# Patient Record
Sex: FEMALE | Race: WHITE | Employment: FULL TIME | URBAN - METROPOLITAN AREA
[De-identification: names, ages, dates, MRNs, and addresses within clinical notes are randomized per-mention and may not be internally consistent; named-entity substitution may affect disease eponyms.]

---

## 2017-04-20 ENCOUNTER — OFFICE VISIT (OUTPATIENT)
Dept: INTERNAL MEDICINE CLINIC | Age: 57
End: 2017-04-20

## 2017-04-20 VITALS
WEIGHT: 172 LBS | BODY MASS INDEX: 34.68 KG/M2 | HEIGHT: 59 IN | DIASTOLIC BLOOD PRESSURE: 78 MMHG | HEART RATE: 72 BPM | SYSTOLIC BLOOD PRESSURE: 120 MMHG | RESPIRATION RATE: 16 BRPM

## 2017-04-20 DIAGNOSIS — Z23 NEED FOR DIPHTHERIA-TETANUS-PERTUSSIS (TDAP) VACCINE, ADULT/ADOLESCENT: ICD-10-CM

## 2017-04-20 DIAGNOSIS — B35.1 ONYCHOMYCOSIS: ICD-10-CM

## 2017-04-20 DIAGNOSIS — Z11.59 NEED FOR HEPATITIS C SCREENING TEST: ICD-10-CM

## 2017-04-20 DIAGNOSIS — Z00.00 ANNUAL PHYSICAL EXAM: Primary | ICD-10-CM

## 2017-04-20 DIAGNOSIS — Z12.11 SCREEN FOR COLON CANCER: ICD-10-CM

## 2017-04-20 DIAGNOSIS — R79.89 ELEVATED LFTS: ICD-10-CM

## 2017-04-20 LAB
A/G RATIO: 1.5 (ref 1.1–2.2)
ALBUMIN SERPL-MCNC: 4.2 G/DL (ref 3.4–5)
ALP BLD-CCNC: 116 U/L (ref 40–129)
ALT SERPL-CCNC: 46 U/L (ref 10–40)
ANION GAP SERPL CALCULATED.3IONS-SCNC: 16 MMOL/L (ref 3–16)
AST SERPL-CCNC: 51 U/L (ref 15–37)
BASOPHILS ABSOLUTE: 0.1 K/UL (ref 0–0.2)
BASOPHILS RELATIVE PERCENT: 1.1 %
BILIRUB SERPL-MCNC: 0.6 MG/DL (ref 0–1)
BUN BLDV-MCNC: 10 MG/DL (ref 7–20)
CALCIUM SERPL-MCNC: 9.1 MG/DL (ref 8.3–10.6)
CHLORIDE BLD-SCNC: 98 MMOL/L (ref 99–110)
CHOLESTEROL, TOTAL: 156 MG/DL (ref 0–199)
CO2: 26 MMOL/L (ref 21–32)
CREAT SERPL-MCNC: 0.5 MG/DL (ref 0.6–1.1)
CREATININE URINE: 262.4 MG/DL (ref 28–259)
EOSINOPHILS ABSOLUTE: 0.2 K/UL (ref 0–0.6)
EOSINOPHILS RELATIVE PERCENT: 3.3 %
GFR AFRICAN AMERICAN: >60
GFR NON-AFRICAN AMERICAN: >60
GLOBULIN: 2.8 G/DL
GLUCOSE BLD-MCNC: 198 MG/DL (ref 70–99)
HCT VFR BLD CALC: 43.4 % (ref 36–48)
HDLC SERPL-MCNC: 43 MG/DL (ref 40–60)
HEMOGLOBIN: 14 G/DL (ref 12–16)
HEPATITIS C ANTIBODY INTERPRETATION: NORMAL
LDL CHOLESTEROL CALCULATED: 90 MG/DL
LYMPHOCYTES ABSOLUTE: 2.3 K/UL (ref 1–5.1)
LYMPHOCYTES RELATIVE PERCENT: 33 %
MCH RBC QN AUTO: 27.7 PG (ref 26–34)
MCHC RBC AUTO-ENTMCNC: 32.2 G/DL (ref 31–36)
MCV RBC AUTO: 85.8 FL (ref 80–100)
MICROALBUMIN UR-MCNC: 2.9 MG/DL
MICROALBUMIN/CREAT UR-RTO: 11.1 MG/G (ref 0–30)
MONOCYTES ABSOLUTE: 0.4 K/UL (ref 0–1.3)
MONOCYTES RELATIVE PERCENT: 6.5 %
NEUTROPHILS ABSOLUTE: 3.8 K/UL (ref 1.7–7.7)
NEUTROPHILS RELATIVE PERCENT: 56.1 %
PDW BLD-RTO: 13.9 % (ref 12.4–15.4)
PLATELET # BLD: 313 K/UL (ref 135–450)
PMV BLD AUTO: 8.6 FL (ref 5–10.5)
POTASSIUM SERPL-SCNC: 4.5 MMOL/L (ref 3.5–5.1)
RBC # BLD: 5.06 M/UL (ref 4–5.2)
SODIUM BLD-SCNC: 140 MMOL/L (ref 136–145)
T4 FREE: 1.3 NG/DL (ref 0.9–1.8)
TOTAL PROTEIN: 7 G/DL (ref 6.4–8.2)
TRIGL SERPL-MCNC: 115 MG/DL (ref 0–150)
VLDLC SERPL CALC-MCNC: 23 MG/DL
WBC # BLD: 6.8 K/UL (ref 4–11)

## 2017-04-20 PROCEDURE — 99386 PREV VISIT NEW AGE 40-64: CPT | Performed by: INTERNAL MEDICINE

## 2017-04-20 PROCEDURE — 90471 IMMUNIZATION ADMIN: CPT | Performed by: INTERNAL MEDICINE

## 2017-04-20 PROCEDURE — 90715 TDAP VACCINE 7 YRS/> IM: CPT | Performed by: INTERNAL MEDICINE

## 2017-04-20 PROCEDURE — 99203 OFFICE O/P NEW LOW 30 MIN: CPT | Performed by: INTERNAL MEDICINE

## 2017-04-20 PROCEDURE — 36415 COLL VENOUS BLD VENIPUNCTURE: CPT | Performed by: INTERNAL MEDICINE

## 2017-04-21 PROBLEM — R79.89 ELEVATED LFTS: Status: ACTIVE | Noted: 2017-04-21

## 2017-04-21 LAB
ESTIMATED AVERAGE GLUCOSE: 234.6 MG/DL
HBA1C MFR BLD: 9.8 %

## 2017-04-21 RX ORDER — LOVASTATIN 10 MG/1
10 TABLET ORAL NIGHTLY
Qty: 90 TABLET | Refills: 3 | Status: SHIPPED | OUTPATIENT
Start: 2017-04-21

## 2017-04-21 RX ORDER — LISINOPRIL 5 MG/1
5 TABLET ORAL DAILY
Qty: 90 TABLET | Refills: 3 | Status: SHIPPED | OUTPATIENT
Start: 2017-04-21

## 2017-04-21 RX ORDER — BLOOD-GLUCOSE METER
1 KIT MISCELLANEOUS DAILY PRN
Qty: 1 KIT | Refills: 0 | Status: SHIPPED | OUTPATIENT
Start: 2017-04-21

## 2017-04-21 RX ORDER — LANCETS 28 GAUGE
1 EACH MISCELLANEOUS DAILY
Qty: 100 EACH | Refills: 3 | Status: SHIPPED | OUTPATIENT
Start: 2017-04-21

## 2017-06-09 ENCOUNTER — OFFICE VISIT (OUTPATIENT)
Dept: INTERNAL MEDICINE CLINIC | Age: 57
End: 2017-06-09

## 2017-06-09 VITALS
WEIGHT: 172 LBS | HEIGHT: 59 IN | RESPIRATION RATE: 16 BRPM | BODY MASS INDEX: 34.68 KG/M2 | HEART RATE: 78 BPM | DIASTOLIC BLOOD PRESSURE: 72 MMHG | SYSTOLIC BLOOD PRESSURE: 124 MMHG

## 2017-06-09 DIAGNOSIS — Z23 NEED FOR STREPTOCOCCUS PNEUMONIAE VACCINATION: ICD-10-CM

## 2017-06-09 DIAGNOSIS — R79.89 ELEVATED LFTS: ICD-10-CM

## 2017-06-09 PROCEDURE — 90732 PPSV23 VACC 2 YRS+ SUBQ/IM: CPT | Performed by: INTERNAL MEDICINE

## 2017-06-09 PROCEDURE — 90471 IMMUNIZATION ADMIN: CPT | Performed by: INTERNAL MEDICINE

## 2017-06-09 PROCEDURE — 99214 OFFICE O/P EST MOD 30 MIN: CPT | Performed by: INTERNAL MEDICINE

## 2017-06-19 ENCOUNTER — OFFICE VISIT (OUTPATIENT)
Dept: INTERNAL MEDICINE CLINIC | Age: 57
End: 2017-06-19

## 2017-06-19 VITALS
RESPIRATION RATE: 16 BRPM | WEIGHT: 171 LBS | HEIGHT: 59 IN | HEART RATE: 64 BPM | BODY MASS INDEX: 34.47 KG/M2 | DIASTOLIC BLOOD PRESSURE: 82 MMHG | SYSTOLIC BLOOD PRESSURE: 115 MMHG

## 2017-06-19 DIAGNOSIS — R42 DIZZINESS: Primary | ICD-10-CM

## 2017-06-19 LAB
BILIRUBIN, POC: NEGATIVE
BLOOD URINE, POC: NEGATIVE
CLARITY, POC: CLEAR
COLOR, POC: YELLOW
GLUCOSE URINE, POC: NEGATIVE
KETONES, POC: NEGATIVE
LEUKOCYTE EST, POC: NEGATIVE
NITRITE, POC: NEGATIVE
PH, POC: 6
PROTEIN, POC: NEGATIVE
SPECIFIC GRAVITY, POC: 1.01
UROBILINOGEN, POC: NEGATIVE

## 2017-06-19 PROCEDURE — 81002 URINALYSIS NONAUTO W/O SCOPE: CPT | Performed by: INTERNAL MEDICINE

## 2017-06-19 PROCEDURE — 99213 OFFICE O/P EST LOW 20 MIN: CPT | Performed by: INTERNAL MEDICINE

## 2017-07-24 ENCOUNTER — HOSPITAL ENCOUNTER (OUTPATIENT)
Dept: GENERAL RADIOLOGY | Age: 57
Discharge: HOME | End: 2017-07-24
Attending: INTERNAL MEDICINE | Admitting: INTERNAL MEDICINE

## 2017-07-24 ENCOUNTER — OFFICE VISIT (OUTPATIENT)
Dept: INTERNAL MEDICINE CLINIC | Age: 57
End: 2017-07-24

## 2017-07-24 VITALS
RESPIRATION RATE: 16 BRPM | HEART RATE: 78 BPM | HEIGHT: 59 IN | BODY MASS INDEX: 33.87 KG/M2 | WEIGHT: 168 LBS | DIASTOLIC BLOOD PRESSURE: 84 MMHG | SYSTOLIC BLOOD PRESSURE: 130 MMHG

## 2017-07-24 PROCEDURE — 99213 OFFICE O/P EST LOW 20 MIN: CPT | Performed by: INTERNAL MEDICINE

## 2017-07-25 LAB
ESTIMATED AVERAGE GLUCOSE: 168.6 MG/DL
HBA1C MFR BLD: 7.5 %

## 2017-10-11 ENCOUNTER — OFFICE VISIT (OUTPATIENT)
Dept: INTERNAL MEDICINE CLINIC | Age: 57
End: 2017-10-11

## 2017-10-11 VITALS
HEIGHT: 59 IN | RESPIRATION RATE: 18 BRPM | BODY MASS INDEX: 33.67 KG/M2 | HEART RATE: 78 BPM | DIASTOLIC BLOOD PRESSURE: 84 MMHG | SYSTOLIC BLOOD PRESSURE: 115 MMHG | WEIGHT: 167 LBS

## 2017-10-11 DIAGNOSIS — E78.5 HYPERLIPIDEMIA ASSOCIATED WITH TYPE 2 DIABETES MELLITUS (HCC): ICD-10-CM

## 2017-10-11 DIAGNOSIS — E11.69 HYPERLIPIDEMIA ASSOCIATED WITH TYPE 2 DIABETES MELLITUS (HCC): ICD-10-CM

## 2017-10-11 DIAGNOSIS — R79.89 ELEVATED LFTS: ICD-10-CM

## 2017-10-11 DIAGNOSIS — E11.9 CONTROLLED TYPE 2 DIABETES MELLITUS WITHOUT COMPLICATION, WITHOUT LONG-TERM CURRENT USE OF INSULIN (HCC): Primary | ICD-10-CM

## 2017-10-11 DIAGNOSIS — H01.001 BLEPHARITIS OF RIGHT UPPER EYELID, UNSPECIFIED TYPE: ICD-10-CM

## 2017-10-11 LAB
ALBUMIN SERPL-MCNC: 4.1 G/DL (ref 3.4–5)
ALP BLD-CCNC: 83 U/L (ref 40–129)
ALT SERPL-CCNC: 44 U/L (ref 10–40)
AST SERPL-CCNC: 54 U/L (ref 15–37)
BILIRUB SERPL-MCNC: 0.4 MG/DL (ref 0–1)
BILIRUBIN DIRECT: <0.2 MG/DL (ref 0–0.3)
BILIRUBIN, INDIRECT: ABNORMAL MG/DL (ref 0–1)
TOTAL PROTEIN: 6.9 G/DL (ref 6.4–8.2)

## 2017-10-11 PROCEDURE — 36415 COLL VENOUS BLD VENIPUNCTURE: CPT | Performed by: INTERNAL MEDICINE

## 2017-10-11 PROCEDURE — 99214 OFFICE O/P EST MOD 30 MIN: CPT | Performed by: INTERNAL MEDICINE

## 2017-10-11 NOTE — PROGRESS NOTES
Fabiana Reese  YOB: 1960    Date of Service:  10/11/2017    Chief Complaint:      Chief Complaint   Patient presents with    3 Month Follow-Up      DM       HPI:  Fabiana Reese is a 62 y.o. She woke up with right upper lid swollen and says this happens about 3-4 times a year and will go away the same day. She's seen opthalmologist for it and no antibiotic is usually given. Treatment Adherence:   Medication compliance:  compliant most of the time  Diet compliance:  compliant most of the time  Weight trend: stable  Current exercise: walks 3 time(s) per week  Barriers: none      Diabetes Mellitus Type 2: Current symptoms/problems include none.  She was tolerating Janumet 50/500 mg bid.  No problems on emperic Lovastatin 10 mg qd and Lisinopril 5 mg qd.      Home blood sugar records: fasting range: 112-127 AM and 109-136 PM  Any episodes of hypoglycemia? no  Eye exam current (within one year): yes  Tobacco history: She  reports that she has never smoked. She has never used smokeless tobacco.   Daily Aspirin?  Yes  Known diabetic complications: none    Lab Results   Component Value Date    LABA1C 7.5 07/24/2017    LABA1C 9.8 04/20/2017    LABMICR 2.90 (H) 04/20/2017     Lab Results   Component Value Date     04/20/2017    K 4.5 04/20/2017    CL 98 (L) 04/20/2017    CO2 26 04/20/2017    BUN 10 04/20/2017    CREATININE 0.5 (L) 04/20/2017    GLUCOSE 198 (H) 04/20/2017    CALCIUM 9.1 04/20/2017     Lab Results   Component Value Date    CHOL 156 04/20/2017    TRIG 115 04/20/2017    HDL 43 04/20/2017    LDLCALC 90 04/20/2017     Lab Results   Component Value Date    ALT 46 (H) 04/20/2017    AST 51 (H) 04/20/2017     Lab Results   Component Value Date    T4FREE 1.3 04/20/2017     Lab Results   Component Value Date    WBC 6.8 04/20/2017    HGB 14.0 04/20/2017    HCT 43.4 04/20/2017    MCV 85.8 04/20/2017     04/20/2017     Lab Results   Component Value Date    INR 1.03 09/02/2015 INR 1.02 10/16/2013      No results found for: PSA   No results found for: [facility]     Patient Active Problem List   Diagnosis    Onychomycosis    Uncontrolled type 2 diabetes mellitus without complication, without long-term current use of insulin (HCC)    Elevated LFTs       Allergies   Allergen Reactions    Erythromycin      Not a true allergy, pt. Prefers to stay away from this Abx due to yeast infection during past use. Okay with zithromax per pt. Outpatient Prescriptions Marked as Taking for the 10/11/17 encounter (Office Visit) with Marika Vasquez MD   Medication Sig Dispense Refill    sitaGLIPtin-metFORMIN (JANUMET XR)  MG TB24 per extended release tablet Take 2 tablets by mouth daily 60 tablet 5    lovastatin (MEVACOR) 10 MG tablet Take 1 tablet by mouth nightly 90 tablet 3    lisinopril (PRINIVIL;ZESTRIL) 5 MG tablet Take 1 tablet by mouth daily 90 tablet 3    FREESTYLE LANCETS MISC 1 each by Does not apply route daily 100 each 3    Blood Glucose Monitoring Suppl (FREESTYLE FREEDOM LITE) W/DEVICE KIT 1 kit by Does not apply route daily as needed (prn) 1 kit 0    glucose blood VI test strips (ASCENSIA AUTODISC VI;ONE TOUCH ULTRA TEST VI) strip 1 each by In Vitro route daily As needed. 100 each 3         Review of Systems: 14 systems were negative except of what was stated on HPI    Nursing note and vitals reviewed. Vitals:    10/11/17 0943   BP: 115/84   Pulse: 78   Resp: 18   Weight: 167 lb (75.8 kg)   Height: 4' 11\" (1.499 m)     Wt Readings from Last 3 Encounters:   10/11/17 167 lb (75.8 kg)   07/24/17 168 lb (76.2 kg)   06/19/17 171 lb (77.6 kg)     BP Readings from Last 3 Encounters:   10/11/17 115/84   07/24/17 130/84   06/19/17 115/82     Body mass index is 33.73 kg/m². Constitutional: Patient appears well-developed and well-nourished. No distress. Head: Normocephalic and atraumatic. Neck: Normal range of motion. Neck supple. No thyroidmegaly.    Cardiovascular: Normal rate,

## 2017-10-12 LAB
ESTIMATED AVERAGE GLUCOSE: 157.1 MG/DL
HBA1C MFR BLD: 7.1 %

## 2018-05-23 RX ORDER — LOVASTATIN 10 MG/1
TABLET ORAL
Qty: 30 TABLET | Refills: 2 | OUTPATIENT
Start: 2018-05-23

## 2018-05-23 RX ORDER — LISINOPRIL 5 MG/1
TABLET ORAL
Qty: 30 TABLET | Refills: 2 | OUTPATIENT
Start: 2018-05-23

## 2018-07-31 ENCOUNTER — TELEPHONE (OUTPATIENT)
Dept: INTERNAL MEDICINE CLINIC | Age: 58
End: 2018-07-31

## 2018-08-07 ENCOUNTER — TELEPHONE (OUTPATIENT)
Dept: INTERNAL MEDICINE CLINIC | Age: 58
End: 2018-08-07

## 2018-08-07 NOTE — TELEPHONE ENCOUNTER
Attempted to call pt to get an appt scheduled because she hasn't been seen in awhile. Pt didn't answer so I left a VM for her to return call to get an appt scheduled.

## 2020-08-22 ENCOUNTER — HOSPITAL ENCOUNTER (EMERGENCY)
Age: 60
Discharge: HOME | End: 2020-08-22
Attending: EMERGENCY MEDICINE
Payer: COMMERCIAL

## 2020-08-22 ENCOUNTER — APPOINTMENT (OUTPATIENT)
Dept: GENERAL RADIOLOGY | Age: 60
End: 2020-08-22
Payer: COMMERCIAL

## 2020-08-22 VITALS
BODY MASS INDEX: 31.45 KG/M2 | OXYGEN SATURATION: 97 % | RESPIRATION RATE: 16 BRPM | WEIGHT: 156 LBS | HEART RATE: 85 BPM | HEIGHT: 59 IN | SYSTOLIC BLOOD PRESSURE: 166 MMHG | DIASTOLIC BLOOD PRESSURE: 99 MMHG | TEMPERATURE: 97.9 F

## 2020-08-22 LAB
EKG ATRIAL RATE: 83 BPM
EKG DIAGNOSIS: NORMAL
EKG P AXIS: 51 DEGREES
EKG P-R INTERVAL: 140 MS
EKG Q-T INTERVAL: 378 MS
EKG QRS DURATION: 70 MS
EKG QTC CALCULATION (BAZETT): 444 MS
EKG R AXIS: 41 DEGREES
EKG T AXIS: 53 DEGREES
EKG VENTRICULAR RATE: 83 BPM

## 2020-08-22 PROCEDURE — 6370000000 HC RX 637 (ALT 250 FOR IP): Performed by: EMERGENCY MEDICINE

## 2020-08-22 PROCEDURE — 6360000002 HC RX W HCPCS: Performed by: EMERGENCY MEDICINE

## 2020-08-22 PROCEDURE — 93005 ELECTROCARDIOGRAM TRACING: CPT | Performed by: EMERGENCY MEDICINE

## 2020-08-22 PROCEDURE — 99283 EMERGENCY DEPT VISIT LOW MDM: CPT

## 2020-08-22 PROCEDURE — 96374 THER/PROPH/DIAG INJ IV PUSH: CPT

## 2020-08-22 PROCEDURE — 71101 X-RAY EXAM UNILAT RIBS/CHEST: CPT

## 2020-08-22 PROCEDURE — 93010 ELECTROCARDIOGRAM REPORT: CPT | Performed by: INTERNAL MEDICINE

## 2020-08-22 RX ORDER — LIDOCAINE 4 G/G
1 PATCH TOPICAL ONCE
Status: DISCONTINUED | OUTPATIENT
Start: 2020-08-22 | End: 2020-08-22 | Stop reason: HOSPADM

## 2020-08-22 RX ORDER — NAPROXEN 500 MG/1
500 TABLET ORAL 2 TIMES DAILY WITH MEALS
Qty: 60 TABLET | Refills: 5 | Status: SHIPPED | OUTPATIENT
Start: 2020-08-22

## 2020-08-22 RX ORDER — KETOROLAC TROMETHAMINE 30 MG/ML
30 INJECTION, SOLUTION INTRAMUSCULAR; INTRAVENOUS ONCE
Status: COMPLETED | OUTPATIENT
Start: 2020-08-22 | End: 2020-08-22

## 2020-08-22 RX ORDER — ANASTROZOLE 1 MG/1
1 TABLET ORAL DAILY
COMMUNITY

## 2020-08-22 RX ORDER — LIDOCAINE 50 MG/G
1 PATCH TOPICAL DAILY
Qty: 10 PATCH | Refills: 0 | Status: SHIPPED | OUTPATIENT
Start: 2020-08-22 | End: 2020-09-01

## 2020-08-22 RX ADMIN — KETOROLAC TROMETHAMINE 30 MG: 30 INJECTION, SOLUTION INTRAMUSCULAR at 02:37

## 2020-08-22 ASSESSMENT — ENCOUNTER SYMPTOMS
CHOKING: 0
NAUSEA: 0
ABDOMINAL DISTENTION: 0
VOMITING: 0
CHEST TIGHTNESS: 0
TROUBLE SWALLOWING: 0
WHEEZING: 0
PHOTOPHOBIA: 0
COLOR CHANGE: 0
COUGH: 0
ABDOMINAL PAIN: 0
SHORTNESS OF BREATH: 0

## 2020-08-22 ASSESSMENT — PAIN SCALES - GENERAL
PAINLEVEL_OUTOF10: 7
PAINLEVEL_OUTOF10: 4
PAINLEVEL_OUTOF10: 7

## 2020-08-22 ASSESSMENT — PAIN DESCRIPTION - ORIENTATION: ORIENTATION: LEFT

## 2020-08-22 ASSESSMENT — PAIN DESCRIPTION - LOCATION: LOCATION: RIB CAGE

## 2020-08-23 NOTE — ED PROVIDER NOTES
[facility]  ED  EMERGENCY DEPARTMENTENCOUNTER      Pt Name: Flo Ying  MRN: [id number]  Elizabeth Shepherd 1960  Date ofevaluation: 8/22/2020  Provider: Bhanu Zuniga MD    CHIEF COMPLAINT       Chief Complaint   Patient presents with    Rib Pain     Pt states she was pulling weeds three weeks ago and felt a pop in left rib cage. Pt continues to have pain. Worse with coughing, sneezing and laughing. HISTORY OF PRESENT ILLNESS   (Location/Symptom, Timing/Onset,Context/Setting, Quality, Duration, Modifying Factors, Severity)  Note limiting factors. Flo Ying is a 61 y.o. female  who  has a past medical history of Cancer ([address] Depression, Diabetes mellitus ([address] Hyperlipidemia associated with type 2 diabetes mellitus ([address] Hypertension, and Uncontrolled type 2 diabetes mellitus without complication, without long-term current use of insulin ([address] who presents to the emergency department for evaluation of left lower chest wall pain. Patient states 3 weeks previously she was bending over working in her garden when she felt a popping sensation in her left lower anterior chest wall. She states that initially she was using with a cane medications and her symptoms improved. She describes a sharp pain whenever she coughed or laughed. She did have tenderness to the affected area. She states that it was over the anterior chest wall and was not in the left upper quadrant of her abdomen. She denies fevers nausea or vomiting. Denies changes in bowel or urine function. Denies cough or shortness of breath. She states that over the past few days her symptoms have worsened despite taking medications. She states that she has no pain when at rest but that the pain is worse with movement exertion and coughing or sneezing. HPI    NursingNotes were reviewed.     REVIEW OF SYSTEMS    (2-9 systems for level 4, 10 or more for level 5)     Review of Systems   Constitutional: Negative for activity change, fatigue and fever. HENT: Negative for congestion, mouth sores and trouble swallowing. Eyes: Negative for photophobia and visual disturbance. Respiratory: Negative for cough, choking, chest tightness, shortness of breath and wheezing. Cardiovascular: Positive for chest pain. Negative for palpitations and leg swelling. Gastrointestinal: Negative for abdominal distention, abdominal pain, nausea and vomiting. Genitourinary: Negative for difficulty urinating and frequency. Musculoskeletal: Negative for gait problem and neck pain. Skin: Negative for color change and rash. Neurological: Negative for dizziness, light-headedness and headaches. Psychiatric/Behavioral: Negative for confusion. The patient is not nervous/anxious. All other systems reviewed and are negative. Except as noted above the remainder of the review of systems was reviewed and negative. PAST MEDICAL HISTORY     Past Medical History:   Diagnosis Date    Cancer [facility])     breast cancer    Depression     [facility] mellitus [facility]     Hyperlipidemia associated with type 2 diabetes mellitus ([facility] [address] 10/11/2017    Hypertension     Uncontrolled type 2 diabetes mellitus without complication, without long-term current use of insulin ([facility] [address] [facility] 2017         SURGICALHISTORY       Past Surgical History:   Procedure Laterality Date    BREAST LUMPECTOMY      x2     SECTION  2002    ENDOMETRIAL ABLATION      HYSTERECTOMY, VAGINAL  2008    OVARY REMOVAL Right 2008         CURRENT MEDICATIONS       Discharge Medication List as of 2020  4:02 AM      CONTINUE these medications which have NOT CHANGED    Details   anastrozole (ARIMIDEX) 1 MG tablet Take 1 mg by mouth dailyHistorical Med      sitaGLIPtin-metFORMIN (JANUMET XR)  MG TB24 per extended release tablet Take 2 tablets by mouth daily, Disp-60 tablet, R-5Please CANCEL other EXISTING REFILLS for this medication on file. Normal      lovastatin (MEVACOR) 10 MG tablet Take 1 tablet by mouth nightly, Disp-90 tablet, R-3Normal      lisinopril (PRINIVIL;ZESTRIL) 5 MG tablet Take 1 tablet by mouth daily, Disp-90 tablet, R-3Normal      FREESTYLE LANCETS MISC DAILY Starting 4/21/2017, Until Discontinued, Disp-100 each, R-3, Normal      Blood Glucose Monitoring Suppl (FREESTYLE FREEDOM LITE) W/DEVICE KIT DAILY PRN Starting 4/21/2017, Until Discontinued, Disp-1 kit, R-0, Normal      glucose blood VI test strips (ASCENSIA AUTODISC VI;ONE TOUCH ULTRA TEST VI) strip DAILY Starting 4/21/2017, Until Discontinued, Disp-100 each, R-3, NormalAs needed. Erythromycin    FAMILY HISTORY       Family History   Problem Relation Age of Onset    Arthritis Mother     Stroke Mother    [facility] Cancer Father         lung     Cancer Sister         ovarian     Emphysema Maternal Grandfather     Early Death Paternal Grandfather         bad whisky    Diabetes Maternal Cousin     Cancer Sister         breast     Diabetes Maternal Cousin           SOCIAL HISTORY       Social History     Socioeconomic History    Marital status:       Spouse name: None    Number of children: None    Years of education: None    Highest education level: None   Occupational History    None   Social Needs    Financial resource strain: None    Food insecurity     Worry: None     Inability: None    Transportation needs     Medical: None     Non-medical: None   Tobacco Use    Smoking status: Never Smoker    Smokeless tobacco: Never Used   Substance and Sexual Activity    Alcohol use: Yes     Comment: once or twice month    Drug use: No    Sexual activity: Not Currently     Partners: Male     Comment:    Lifestyle    Physical activity     Days per week: None     Minutes per session: None    Stress: None   Relationships    Social connections     Talks on phone: None     Gets together: None     Attends Jew service: None     Active member of club or organization: None     Attends meetings of clubs or organizations: None     Relationship status: None    Intimate partner violence     Fear of current or ex partner: None     Emotionally abused: None     Physically abused: None     Forced sexual activity: None   Other Topics Concern    None   Social History Narrative    None       SCREENINGS             PHYSICAL EXAM    (up to 7 for level 4, 8 or more for level 5)     ED Triage Vitals [08/22/20 0149]   BP Temp Temp Source Pulse Resp SpO2 Height Weight   (!) 169/93 97.9 °F (36.6 °C) Oral 100 16 97 % 4' 11\" (1.499 m) 156 lb (70.8 kg)       Physical Exam  Vitals signs and nursing note reviewed. Constitutional:       Appearance: She is well-developed. HENT:      Head: Normocephalic and atraumatic. Eyes:      Conjunctiva/sclera: Conjunctivae normal.      Pupils: Pupils are equal, round, and reactive to light. Neck:      Musculoskeletal: Normal range of motion. Trachea: No tracheal deviation. Cardiovascular:      Rate and Rhythm: Normal rate and regular rhythm. Heart sounds: Normal heart sounds. Pulmonary:      Effort: Pulmonary effort is normal.      Breath sounds: Normal breath sounds. Chest:      Chest wall: Tenderness present. Abdominal:      General: There is no distension. Palpations: Abdomen is soft. Tenderness: There is no abdominal tenderness. Musculoskeletal: Normal range of motion. Skin:     General: Skin is warm and dry. Neurological:      Mental Status: She is alert and oriented to person, place, and time. RESULTS     EKG: All EKG's are interpreted by the Emergency Department Physician who either signs or Co-signsthis chart in the absence of a cardiologist.    EKG shows a sinus rhythm with a ventricular rate of 83 bpm.  Patient's IL interval and QTc interval within normal limits. Patient has a normal axis. There are no significant ST elevations or depressions EKG is nondiagnostic for ACS.   Compared EKG from 9-15 there are no significant changes. RADIOLOGY:   Non-plain filmimages such as CT, Ultrasound and MRI are read by the radiologist. Plain radiographic images are visualized and preliminarily interpreted by the emergency physician with the below findings:    Interpretation per the Radiologist below, if available at the time ofthis note:    XR RIBS LEFT INCLUDE CHEST (MIN 3 VIEWS)   Final Result   No acute findings in the chest.  Specifically, no displaced left rib   fractures are identified. ED BEDSIDE ULTRASOUND:   Performed by ED Physician - none    LABS:  Labs Reviewed - No data to display    All other labs were within normal range or not returned as of this dictation. EMERGENCY DEPARTMENT COURSE and DIFFERENTIAL DIAGNOSIS/MDM:   Vitals:    Vitals:    08/22/20 0149 08/22/20 0415   BP: (!) 169/93 (!) 166/99   Pulse: 100 85   Resp: 16 16   Temp: 97.9 °F (36.6 °C)    TempSrc: Oral    SpO2: 97% 97%   Weight: 156 lb (70.8 kg)    Height: 4' 11\" (1.499 m)        Patient was given thefollowing medications:  Medications   ketorolac (TORADOL) injection 30 mg (30 mg Intravenous Given 8/22/20 0237)       ED COURSE & MEDICAL DECISION MAKING    Pertinent Labs & Imaging studies reviewed. (See chart for details)   -  Patient seen and evaluated in the emergency department. -  Triage and nursing notes reviewed and incorporated. -  Old chart records reviewed and incorporated. -  Differential diagnosis includes: Differential Diagnosis: Rib fractures, Pulmonary Contusion, Cardiac contusion, Pneumothorax, Pneumomediastinum, Hemothorax, Splenic laceration, Liver laceration, Renal contusion, Thoracic aortic injury, other.    -  Work-up included:  See above  -  ED treatment included: See above  -  Results discussed with patient. Patient has tenderness along the lower rib the left lower anterior chest wall. Lungs are clear to auscultation.   She has no left upper quadrant tenderness on exam.  imaging studies show no displaced rib fractures or signs of pneumothorax or pneumoperitoneum. Patient given Toradol and lidocaine patch and reevaluation states that her pain is significantly improved. Patient feels improved on reevaluation. Symptomatic treatment with expectant management discussed with the patient and the amenable to treatment plan and outpatient follow-up. Strict return precautions were discussed with the patient. They demonstrated understanding of when to return to the emergency department for new or worsening symptoms. .  The patient is agreeable with plan of care and disposition. REASSESSMENT          CRITICAL CARE TIME   Total Critical Care time was 0 minutes, excluding separatelyreportable procedures. There was a high probability ofclinically significant/life threatening deterioration in the patient's condition which required my urgent intervention. CONSULTS:  None    PROCEDURES:  Unless otherwise noted below, none     Procedures    FINAL IMPRESSION      1.  Rib pain on left side          DISPOSITION/PLAN   DISPOSITION Decision To Discharge 08/22/2020 04:00:18 AM      PATIENT REFERREDTO:  BRIE Castro - CNP  [address] [place] [address] Drive [address] [place] [address]  [phone]    Schedule an appointment as soon as possible for a visit   As needed      DISCHARGEMEDICATIONS:  Discharge Medication List as of 8/22/2020  4:02 AM      START taking these medications    Details   naproxen (NAPROSYN) 500 MG tablet Take 1 tablet by mouth 2 times daily (with meals), Disp-60 tablet,R-5Print      lidocaine (LIDODERM) 5 % Place 1 patch onto the skin daily for 10 days 12 hours on, 12 hours off., Disp-10 patch,R-0Print                (Please note that portions of this note were completed with a voice recognition program.  Efforts were made to edit the dictations but occasionally words are mis-transcribed.)    Chriss Piedra MD (electronically signed)  Attending Emergency Physician          Chriss Piedra MD  08/22/20 2052

## 2023-03-09 ENCOUNTER — APPOINTMENT (OUTPATIENT)
Dept: CT IMAGING | Age: 63
End: 2023-03-09
Payer: COMMERCIAL

## 2023-03-09 ENCOUNTER — HOSPITAL ENCOUNTER (OUTPATIENT)
Age: 63
Setting detail: OBSERVATION
Discharge: HOME | End: 2023-03-10
Attending: STUDENT IN AN ORGANIZED HEALTH CARE EDUCATION/TRAINING PROGRAM | Admitting: INTERNAL MEDICINE
Payer: COMMERCIAL

## 2023-03-09 ENCOUNTER — APPOINTMENT (OUTPATIENT)
Dept: GENERAL RADIOLOGY | Age: 63
End: 2023-03-09
Payer: COMMERCIAL

## 2023-03-09 DIAGNOSIS — G45.9 TIA (TRANSIENT ISCHEMIC ATTACK): Primary | ICD-10-CM

## 2023-03-09 DIAGNOSIS — R51.9 ACUTE INTRACTABLE HEADACHE, UNSPECIFIED HEADACHE TYPE: ICD-10-CM

## 2023-03-09 LAB
A/G RATIO: 1.3 (ref 1.1–2.2)
ALBUMIN SERPL-MCNC: 4.3 G/DL (ref 3.4–5)
ALP BLD-CCNC: 124 U/L (ref 40–129)
ALT SERPL-CCNC: 15 U/L (ref 10–40)
ANION GAP SERPL CALCULATED.3IONS-SCNC: 14 MMOL/L (ref 3–16)
AST SERPL-CCNC: 18 U/L (ref 15–37)
BASOPHILS ABSOLUTE: 0.1 K/UL (ref 0–0.2)
BASOPHILS RELATIVE PERCENT: 1 %
BILIRUB SERPL-MCNC: <0.2 MG/DL (ref 0–1)
BUN BLDV-MCNC: 13 MG/DL (ref 7–20)
CALCIUM SERPL-MCNC: 9.8 MG/DL (ref 8.3–10.6)
CHLORIDE BLD-SCNC: 98 MMOL/L (ref 99–110)
CO2: 25 MMOL/L (ref 21–32)
CREAT SERPL-MCNC: 0.7 MG/DL (ref 0.6–1.2)
EOSINOPHILS ABSOLUTE: 0.4 K/UL (ref 0–0.6)
EOSINOPHILS RELATIVE PERCENT: 4 %
GFR SERPL CREATININE-BSD FRML MDRD: >60 ML/MIN/{1.73_M2}
GLUCOSE BLD-MCNC: 192 MG/DL (ref 70–99)
GLUCOSE BLD-MCNC: 207 MG/DL (ref 70–99)
HCT VFR BLD CALC: 40.2 % (ref 36–48)
HEMOGLOBIN: 13.5 G/DL (ref 12–16)
INR BLD: 0.97 (ref 0.87–1.14)
LYMPHOCYTES ABSOLUTE: 2.5 K/UL (ref 1–5.1)
LYMPHOCYTES RELATIVE PERCENT: 25.8 %
MCH RBC QN AUTO: 27.4 PG (ref 26–34)
MCHC RBC AUTO-ENTMCNC: 33.6 G/DL (ref 31–36)
MCV RBC AUTO: 81.3 FL (ref 80–100)
MONOCYTES ABSOLUTE: 0.7 K/UL (ref 0–1.3)
MONOCYTES RELATIVE PERCENT: 6.8 %
NEUTROPHILS ABSOLUTE: 6.1 K/UL (ref 1.7–7.7)
NEUTROPHILS RELATIVE PERCENT: 62.4 %
PDW BLD-RTO: 13.8 % (ref 12.4–15.4)
PERFORMED ON: ABNORMAL
PLATELET # BLD: 365 K/UL (ref 135–450)
PMV BLD AUTO: 7.7 FL (ref 5–10.5)
POTASSIUM SERPL-SCNC: 3.9 MMOL/L (ref 3.5–5.1)
PROTHROMBIN TIME: 12.7 SEC (ref 11.7–14.5)
RBC # BLD: 4.95 M/UL (ref 4–5.2)
SODIUM BLD-SCNC: 137 MMOL/L (ref 136–145)
TOTAL PROTEIN: 7.5 G/DL (ref 6.4–8.2)
TROPONIN: <0.01 NG/ML
WBC # BLD: 9.7 K/UL (ref 4–11)

## 2023-03-09 PROCEDURE — 85025 COMPLETE CBC W/AUTO DIFF WBC: CPT

## 2023-03-09 PROCEDURE — 36415 COLL VENOUS BLD VENIPUNCTURE: CPT

## 2023-03-09 PROCEDURE — 80053 COMPREHEN METABOLIC PANEL: CPT

## 2023-03-09 PROCEDURE — 84484 ASSAY OF TROPONIN QUANT: CPT

## 2023-03-09 PROCEDURE — 85610 PROTHROMBIN TIME: CPT

## 2023-03-09 PROCEDURE — 71045 X-RAY EXAM CHEST 1 VIEW: CPT

## 2023-03-09 PROCEDURE — 6370000000 HC RX 637 (ALT 250 FOR IP): Performed by: STUDENT IN AN ORGANIZED HEALTH CARE EDUCATION/TRAINING PROGRAM

## 2023-03-09 PROCEDURE — 70498 CT ANGIOGRAPHY NECK: CPT

## 2023-03-09 PROCEDURE — 70450 CT HEAD/BRAIN W/O DYE: CPT

## 2023-03-09 PROCEDURE — 93005 ELECTROCARDIOGRAM TRACING: CPT | Performed by: STUDENT IN AN ORGANIZED HEALTH CARE EDUCATION/TRAINING PROGRAM

## 2023-03-09 PROCEDURE — 6360000004 HC RX CONTRAST MEDICATION: Performed by: STUDENT IN AN ORGANIZED HEALTH CARE EDUCATION/TRAINING PROGRAM

## 2023-03-09 PROCEDURE — 99285 EMERGENCY DEPT VISIT HI MDM: CPT

## 2023-03-09 RX ORDER — ASPIRIN 81 MG/1
324 TABLET, CHEWABLE ORAL ONCE
Status: COMPLETED | OUTPATIENT
Start: 2023-03-09 | End: 2023-03-09

## 2023-03-09 RX ORDER — ACETAMINOPHEN 500 MG
1000 TABLET ORAL ONCE
Status: COMPLETED | OUTPATIENT
Start: 2023-03-09 | End: 2023-03-09

## 2023-03-09 RX ADMIN — IOPAMIDOL 75 ML: 755 INJECTION, SOLUTION INTRAVENOUS at 22:13

## 2023-03-09 RX ADMIN — ACETAMINOPHEN 1000 MG: 500 TABLET ORAL at 23:36

## 2023-03-09 RX ADMIN — ASPIRIN 81 MG 324 MG: 81 TABLET ORAL at 23:35

## 2023-03-09 ASSESSMENT — PAIN - FUNCTIONAL ASSESSMENT: PAIN_FUNCTIONAL_ASSESSMENT: 0-10

## 2023-03-09 ASSESSMENT — PAIN SCALES - GENERAL
PAINLEVEL_OUTOF10: 8
PAINLEVEL_OUTOF10: 7

## 2023-03-09 ASSESSMENT — PAIN DESCRIPTION - DESCRIPTORS
DESCRIPTORS: ACHING
DESCRIPTORS: ACHING

## 2023-03-09 ASSESSMENT — PAIN DESCRIPTION - PAIN TYPE: TYPE: ACUTE PAIN

## 2023-03-09 ASSESSMENT — PAIN DESCRIPTION - LOCATION: LOCATION: HEAD

## 2023-03-10 ENCOUNTER — APPOINTMENT (OUTPATIENT)
Dept: MRI IMAGING | Age: 63
End: 2023-03-10
Payer: COMMERCIAL

## 2023-03-10 VITALS
WEIGHT: 152.6 LBS | SYSTOLIC BLOOD PRESSURE: 134 MMHG | HEART RATE: 76 BPM | HEIGHT: 59 IN | BODY MASS INDEX: 30.76 KG/M2 | TEMPERATURE: 98.2 F | RESPIRATION RATE: 16 BRPM | DIASTOLIC BLOOD PRESSURE: 84 MMHG | OXYGEN SATURATION: 96 %

## 2023-03-10 PROBLEM — G45.9 TRANSIENT ISCHEMIC ATTACK (TIA): Status: ACTIVE | Noted: 2023-03-10

## 2023-03-10 LAB
A/G RATIO: 1.6 (ref 1.1–2.2)
ALBUMIN SERPL-MCNC: 4.1 G/DL (ref 3.4–5)
ALP BLD-CCNC: 103 U/L (ref 40–129)
ALT SERPL-CCNC: 13 U/L (ref 10–40)
AMPHETAMINE SCREEN, URINE: NORMAL
ANION GAP SERPL CALCULATED.3IONS-SCNC: 10 MMOL/L (ref 3–16)
AST SERPL-CCNC: 15 U/L (ref 15–37)
BACTERIA: ABNORMAL /HPF
BARBITURATE SCREEN URINE: NORMAL
BASOPHILS ABSOLUTE: 0.1 K/UL (ref 0–0.2)
BASOPHILS RELATIVE PERCENT: 1 %
BENZODIAZEPINE SCREEN, URINE: NORMAL
BILIRUB SERPL-MCNC: <0.2 MG/DL (ref 0–1)
BILIRUBIN URINE: NEGATIVE
BLOOD, URINE: NEGATIVE
BUN BLDV-MCNC: 13 MG/DL (ref 7–20)
CALCIUM SERPL-MCNC: 9.5 MG/DL (ref 8.3–10.6)
CANNABINOID SCREEN URINE: NORMAL
CHLORIDE BLD-SCNC: 104 MMOL/L (ref 99–110)
CHOLESTEROL, TOTAL: 135 MG/DL (ref 0–199)
CLARITY: CLEAR
CO2: 26 MMOL/L (ref 21–32)
COCAINE METABOLITE SCREEN URINE: NORMAL
COLOR: YELLOW
CREAT SERPL-MCNC: 0.7 MG/DL (ref 0.6–1.2)
EKG ATRIAL RATE: 85 BPM
EKG DIAGNOSIS: NORMAL
EKG P AXIS: 58 DEGREES
EKG P-R INTERVAL: 152 MS
EKG Q-T INTERVAL: 372 MS
EKG QRS DURATION: 72 MS
EKG QTC CALCULATION (BAZETT): 439 MS
EKG R AXIS: 31 DEGREES
EKG T AXIS: 41 DEGREES
EKG VENTRICULAR RATE: 84 BPM
EOSINOPHILS ABSOLUTE: 0.5 K/UL (ref 0–0.6)
EOSINOPHILS RELATIVE PERCENT: 5.6 %
EPITHELIAL CELLS, UA: ABNORMAL /HPF (ref 0–5)
ESTIMATED AVERAGE GLUCOSE: 159.9 MG/DL
FENTANYL SCREEN, URINE: NORMAL
FOLATE: 14.53 NG/ML (ref 4.78–24.2)
GFR SERPL CREATININE-BSD FRML MDRD: >60 ML/MIN/{1.73_M2}
GLUCOSE BLD-MCNC: 151 MG/DL (ref 70–99)
GLUCOSE URINE: >=1000 MG/DL
HBA1C MFR BLD: 7.2 %
HCT VFR BLD CALC: 37.3 % (ref 36–48)
HDLC SERPL-MCNC: 48 MG/DL (ref 40–60)
HEMOGLOBIN: 12.6 G/DL (ref 12–16)
KETONES, URINE: NEGATIVE MG/DL
LDL CHOLESTEROL CALCULATED: 73 MG/DL
LEUKOCYTE ESTERASE, URINE: ABNORMAL
LV EF: 58 %
LVEF MODALITY: NORMAL
LYMPHOCYTES ABSOLUTE: 2.5 K/UL (ref 1–5.1)
LYMPHOCYTES RELATIVE PERCENT: 30.5 %
Lab: NORMAL
MCH RBC QN AUTO: 27.4 PG (ref 26–34)
MCHC RBC AUTO-ENTMCNC: 33.7 G/DL (ref 31–36)
MCV RBC AUTO: 81.2 FL (ref 80–100)
METHADONE SCREEN, URINE: NORMAL
MICROSCOPIC EXAMINATION: YES
MONOCYTES ABSOLUTE: 0.6 K/UL (ref 0–1.3)
MONOCYTES RELATIVE PERCENT: 7.8 %
NEUTROPHILS ABSOLUTE: 4.5 K/UL (ref 1.7–7.7)
NEUTROPHILS RELATIVE PERCENT: 55.1 %
NITRITE, URINE: NEGATIVE
OPIATE SCREEN URINE: NORMAL
OXYCODONE URINE: NORMAL
PDW BLD-RTO: 14.1 % (ref 12.4–15.4)
PH UA: 7
PH UA: 7 (ref 5–8)
PHENCYCLIDINE SCREEN URINE: NORMAL
PLATELET # BLD: 329 K/UL (ref 135–450)
PMV BLD AUTO: 7.3 FL (ref 5–10.5)
POTASSIUM REFLEX MAGNESIUM: 4.4 MMOL/L (ref 3.5–5.1)
PROTEIN UA: NEGATIVE MG/DL
RBC # BLD: 4.6 M/UL (ref 4–5.2)
RBC UA: ABNORMAL /HPF (ref 0–4)
SODIUM BLD-SCNC: 140 MMOL/L (ref 136–145)
SPECIFIC GRAVITY UA: 1.01 (ref 1–1.03)
TOTAL PROTEIN: 6.6 G/DL (ref 6.4–8.2)
TRIGL SERPL-MCNC: 68 MG/DL (ref 0–150)
TROPONIN: <0.01 NG/ML
TSH REFLEX FT4: 2.91 UIU/ML (ref 0.27–4.2)
URINE REFLEX TO CULTURE: ABNORMAL
URINE TYPE: ABNORMAL
UROBILINOGEN, URINE: 0.2 E.U./DL
VITAMIN B-12: 503 PG/ML (ref 211–911)
VLDLC SERPL CALC-MCNC: 14 MG/DL
WBC # BLD: 8.1 K/UL (ref 4–11)
WBC UA: ABNORMAL /HPF (ref 0–5)

## 2023-03-10 PROCEDURE — 84484 ASSAY OF TROPONIN QUANT: CPT

## 2023-03-10 PROCEDURE — C8929 TTE W OR WO FOL WCON,DOPPLER: HCPCS

## 2023-03-10 PROCEDURE — 80053 COMPREHEN METABOLIC PANEL: CPT

## 2023-03-10 PROCEDURE — 82746 ASSAY OF FOLIC ACID SERUM: CPT

## 2023-03-10 PROCEDURE — G0378 HOSPITAL OBSERVATION PER HR: HCPCS

## 2023-03-10 PROCEDURE — 96365 THER/PROPH/DIAG IV INF INIT: CPT

## 2023-03-10 PROCEDURE — 6360000004 HC RX CONTRAST MEDICATION: Performed by: INTERNAL MEDICINE

## 2023-03-10 PROCEDURE — 92610 EVALUATE SWALLOWING FUNCTION: CPT

## 2023-03-10 PROCEDURE — 36415 COLL VENOUS BLD VENIPUNCTURE: CPT

## 2023-03-10 PROCEDURE — 80061 LIPID PANEL: CPT

## 2023-03-10 PROCEDURE — 81001 URINALYSIS AUTO W/SCOPE: CPT

## 2023-03-10 PROCEDURE — 93010 ELECTROCARDIOGRAM REPORT: CPT | Performed by: INTERNAL MEDICINE

## 2023-03-10 PROCEDURE — 96372 THER/PROPH/DIAG INJ SC/IM: CPT

## 2023-03-10 PROCEDURE — 82607 VITAMIN B-12: CPT

## 2023-03-10 PROCEDURE — 2580000003 HC RX 258: Performed by: INTERNAL MEDICINE

## 2023-03-10 PROCEDURE — 96375 TX/PRO/DX INJ NEW DRUG ADDON: CPT

## 2023-03-10 PROCEDURE — 70551 MRI BRAIN STEM W/O DYE: CPT

## 2023-03-10 PROCEDURE — 83036 HEMOGLOBIN GLYCOSYLATED A1C: CPT

## 2023-03-10 PROCEDURE — 6360000002 HC RX W HCPCS: Performed by: INTERNAL MEDICINE

## 2023-03-10 PROCEDURE — 80307 DRUG TEST PRSMV CHEM ANLYZR: CPT

## 2023-03-10 PROCEDURE — 84443 ASSAY THYROID STIM HORMONE: CPT

## 2023-03-10 PROCEDURE — 85025 COMPLETE CBC W/AUTO DIFF WBC: CPT

## 2023-03-10 RX ORDER — POLYETHYLENE GLYCOL 3350 17 G/17G
17 POWDER, FOR SOLUTION ORAL DAILY PRN
Status: DISCONTINUED | OUTPATIENT
Start: 2023-03-10 | End: 2023-03-10 | Stop reason: HOSPADM

## 2023-03-10 RX ORDER — ENOXAPARIN SODIUM 100 MG/ML
40 INJECTION SUBCUTANEOUS DAILY
Status: DISCONTINUED | OUTPATIENT
Start: 2023-03-10 | End: 2023-03-10 | Stop reason: HOSPADM

## 2023-03-10 RX ORDER — SODIUM CHLORIDE 0.9 % (FLUSH) 0.9 %
10 SYRINGE (ML) INJECTION PRN
Status: DISCONTINUED | OUTPATIENT
Start: 2023-03-10 | End: 2023-03-10 | Stop reason: HOSPADM

## 2023-03-10 RX ORDER — PROMETHAZINE HYDROCHLORIDE 25 MG/1
12.5 TABLET ORAL EVERY 6 HOURS PRN
Status: DISCONTINUED | OUTPATIENT
Start: 2023-03-10 | End: 2023-03-10 | Stop reason: HOSPADM

## 2023-03-10 RX ORDER — SODIUM CHLORIDE 9 MG/ML
INJECTION, SOLUTION INTRAVENOUS PRN
Status: DISCONTINUED | OUTPATIENT
Start: 2023-03-10 | End: 2023-03-10 | Stop reason: HOSPADM

## 2023-03-10 RX ORDER — ATORVASTATIN CALCIUM 80 MG/1
80 TABLET, FILM COATED ORAL NIGHTLY
Status: DISCONTINUED | OUTPATIENT
Start: 2023-03-10 | End: 2023-03-10 | Stop reason: HOSPADM

## 2023-03-10 RX ORDER — ASPIRIN 300 MG/1
300 SUPPOSITORY RECTAL DAILY
Status: DISCONTINUED | OUTPATIENT
Start: 2023-03-10 | End: 2023-03-10 | Stop reason: HOSPADM

## 2023-03-10 RX ORDER — MAGNESIUM SULFATE IN WATER 40 MG/ML
2000 INJECTION, SOLUTION INTRAVENOUS PRN
Status: DISCONTINUED | OUTPATIENT
Start: 2023-03-10 | End: 2023-03-10

## 2023-03-10 RX ORDER — ASPIRIN 81 MG/1
81 TABLET ORAL DAILY
Qty: 30 TABLET | Refills: 3 | Status: SHIPPED | OUTPATIENT
Start: 2023-03-10

## 2023-03-10 RX ORDER — SODIUM CHLORIDE 0.9 % (FLUSH) 0.9 %
10 SYRINGE (ML) INJECTION EVERY 12 HOURS SCHEDULED
Status: DISCONTINUED | OUTPATIENT
Start: 2023-03-10 | End: 2023-03-10 | Stop reason: HOSPADM

## 2023-03-10 RX ORDER — ASPIRIN 81 MG/1
81 TABLET ORAL DAILY
Status: DISCONTINUED | OUTPATIENT
Start: 2023-03-10 | End: 2023-03-10 | Stop reason: HOSPADM

## 2023-03-10 RX ORDER — CEPHALEXIN 500 MG/1
500 CAPSULE ORAL 2 TIMES DAILY
Qty: 14 CAPSULE | Refills: 0 | Status: SHIPPED | OUTPATIENT
Start: 2023-03-10 | End: 2023-03-17

## 2023-03-10 RX ORDER — ATORVASTATIN CALCIUM 80 MG/1
80 TABLET, FILM COATED ORAL NIGHTLY
Qty: 30 TABLET | Refills: 3 | Status: SHIPPED | OUTPATIENT
Start: 2023-03-10

## 2023-03-10 RX ORDER — LABETALOL HYDROCHLORIDE 5 MG/ML
10 INJECTION, SOLUTION INTRAVENOUS EVERY 10 MIN PRN
Status: DISCONTINUED | OUTPATIENT
Start: 2023-03-10 | End: 2023-03-10 | Stop reason: HOSPADM

## 2023-03-10 RX ORDER — ACETAMINOPHEN 650 MG/1
650 SUPPOSITORY RECTAL EVERY 6 HOURS PRN
Status: DISCONTINUED | OUTPATIENT
Start: 2023-03-10 | End: 2023-03-10 | Stop reason: HOSPADM

## 2023-03-10 RX ORDER — POTASSIUM CHLORIDE 7.45 MG/ML
10 INJECTION INTRAVENOUS PRN
Status: DISCONTINUED | OUTPATIENT
Start: 2023-03-10 | End: 2023-03-10

## 2023-03-10 RX ORDER — ONDANSETRON 2 MG/ML
4 INJECTION INTRAMUSCULAR; INTRAVENOUS EVERY 6 HOURS PRN
Status: DISCONTINUED | OUTPATIENT
Start: 2023-03-10 | End: 2023-03-10 | Stop reason: HOSPADM

## 2023-03-10 RX ORDER — ACETAMINOPHEN 325 MG/1
650 TABLET ORAL EVERY 6 HOURS PRN
Status: DISCONTINUED | OUTPATIENT
Start: 2023-03-10 | End: 2023-03-10 | Stop reason: HOSPADM

## 2023-03-10 RX ORDER — LORAZEPAM 2 MG/ML
0.5 INJECTION INTRAMUSCULAR
Status: COMPLETED | OUTPATIENT
Start: 2023-03-10 | End: 2023-03-10

## 2023-03-10 RX ADMIN — LORAZEPAM 0.5 MG: 2 INJECTION INTRAMUSCULAR; INTRAVENOUS at 09:55

## 2023-03-10 RX ADMIN — ENOXAPARIN SODIUM 40 MG: 100 INJECTION SUBCUTANEOUS at 09:01

## 2023-03-10 RX ADMIN — PERFLUTREN 1.5 ML: 6.52 INJECTION, SUSPENSION INTRAVENOUS at 12:16

## 2023-03-10 RX ADMIN — CEFTRIAXONE SODIUM 1000 MG: 1 INJECTION, POWDER, FOR SOLUTION INTRAMUSCULAR; INTRAVENOUS at 03:39

## 2023-03-10 RX ADMIN — Medication 10 ML: at 09:01

## 2023-03-10 ASSESSMENT — PAIN SCALES - GENERAL
PAINLEVEL_OUTOF10: 0

## 2023-03-10 ASSESSMENT — PAIN DESCRIPTION - DESCRIPTORS: DESCRIPTORS: ACHING

## 2023-03-10 ASSESSMENT — PAIN DESCRIPTION - PAIN TYPE: TYPE: ACUTE PAIN

## 2023-03-10 NOTE — H&P
Hospital Medicine History & Physical      PCP: BRIE Arguello CNP    Date of Admission: 3/9/2023    Date of Service: Pt seen/examined on 03/10/2023    Pt seen/examined face to face on and admitted as inpatient with expected LOS to be two days but can change depending on diagnostic work up and treatment response. Chief Complaint:    Chief Complaint   Patient presents with    Headache     Pt states on Tuesday had similar symptoms. Pt states pcp today pt to come be seen in the ER if symptoms happened again. Pt states around 2030 got a headache with right hand numbness. Pt states when it began she felt like she was having terrible finding her words but that has resolved since. History Of Present Illness:      58 y.o. 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 Patient reported that she was typing and she was not typing the right words and different position. Patient reported talking to people because she knew that if she talks it will come out wrong. Patient left leg in the car had another episode today and was told by her friend that she needs to come into the hospital to get further evaluation. Patient otherwise reports no known alleviating or exacerbating factor no history of fever chills nausea vomiting chest pain abdominal pain or dysuria.       Past Medical History:          Diagnosis Date    Cancer ([address]     breast cancer    Depression     Diabetes mellitus ([facility] [address]     Hyperlipidemia associated with type 2 diabetes mellitus ([address] 10/11/2017    Hypertension     Uncontrolled type 2 diabetes mellitus without complication, without long-term current use of insulin 4/21/2017       Past Surgical History: Procedure Laterality Date    BREAST LUMPECTOMY      x2     SECTION      ENDOMETRIAL ABLATION      HYSTERECTOMY, VAGINAL  2008    OVARY REMOVAL Right 2008       Medications Prior to Admission:      Prior to Admission medications    Medication Sig Start Date End Date Taking? Authorizing Provider   anastrozole (ARIMIDEX) 1 MG tablet Take 1 mg by mouth daily    Historical Provider, MD   naproxen (NAPROSYN) 500 MG tablet Take 1 tablet by mouth 2 times daily (with meals) 20   Jose Alberto Cash MD   sitaGLIPtin-metFORMIN (JANUMET XR)  MG TB24 per extended release tablet Take 2 tablets by mouth daily 17   Jules Chawla MD   lovastatin (MEVACOR) 10 MG tablet Take 1 tablet by mouth nightly 17   Jules Chawla MD   lisinopril (PRINIVIL;ZESTRIL) 5 MG tablet Take 1 tablet by mouth daily 17   Jules Chawla MD   FREESTYLE LANCETS MISC 1 each by Does not apply route daily 17   Jules Chawla MD   Blood Glucose Monitoring Suppl (FREESTYLE FREEDOM LITE) W/DEVICE KIT 1 kit by Does not apply route daily as needed (prn) 17   July Chawla MD   glucose blood VI test strips (ASCENSIA AUTODISC VI;ONE TOUCH ULTRA TEST VI) strip 1 each by In Vitro route daily As needed. 17   Jules Chawla MD       Allergies:  Erythromycin    Social History:          TOBACCO:   reports that she has never smoked. She has never used smokeless tobacco.  ETOH:   reports current alcohol use.   E-cigarette/Vaping       Questions Responses    E-cigarette/Vaping Use     Start Date     Passive Exposure     Quit Date     Counseling Given     Comments               Family History:      Family History reviewed with patient, and does not pertain and non-contributory to the current illness        Problem Relation Age of Onset    Arthritis Mother     Stroke Mother     Cancer Father         lung     Cancer Sister         ovarian     Emphysema Maternal Grandfather     Early Death Paternal Grandfather         bad whisky Diabetes Maternal Cousin     Cancer Sister         breast     Diabetes Maternal Cousin        REVIEW OF SYSTEMS:     Constitutional:  No Fever, No Chills, No Night Sweats  ENT/Mouth:  No Nasal Congestion,  No Hoarseness, No new mouth lesion  Eyes:  No Eye Pain, No Redness, No Discharge  Cardiovascular:  No Chest Pain, No Orthopnea, No Palpitations  Respiratory:  No Cough, No Sputum, No Dyspnea  Gastrointestinal: No Vomiting, No Diarrhea, No abdominal pain  Genitourinary: No Urinary Frequency, No Hematuria, No Urinary pain  Musculoskeletal:  No worsening Arthralgias, No worsening Myalgias  Skin:  No new Skin Lesions, No new skin rash  Neuro:  No new weakness, No new numbness. Psych:  No suicial ideation, No Violence ideation    PHYSICAL EXAM PERFORMED:    BP (!) 179/87   Pulse 83   Temp 97.9 °F (36.6 °C) (Oral)   Resp 16   Ht 4' 11\" (1.499 m)   Wt 152 lb 9.6 oz (69.2 kg)   SpO2 97%   BMI 30.82 kg/m²     General appearance:  mild acute distress, appears older than stated age  HEENT:   atraumatic, sclera anicteric, Conjunctivae clear. Neck: Supple,Trachea midline, no goiter  Respiratory:minimal accessory muscle usage, Normal respiratory effort. Clear to auscultation, bilaterally without wheezing  Cardiovascular:  Regular rate and rhythm, capillary refill 2 seconds  Abdomen: Soft, non-tender, non-distended with normal bowel sounds. Musculoskeletal:  No clubbing, cyanosis. trace edema LE bilaterally. Skin: turgor normal.  No new rashes or lesions. Neurologic: Alert and oriented x4, no new focal sensory/motor deficits.    NIHSS 0, no pronator drift  Labs:     Recent Labs     03/09/23 2153   WBC 9.7   HGB 13.5   HCT 40.2        Recent Labs     03/09/23 2153      K 3.9   CL 98*   CO2 25   BUN 13   CREATININE 0.7   CALCIUM 9.8     Recent Labs     03/09/23 2153   AST 18   ALT 15   BILITOT <0.2   ALKPHOS 124     Recent Labs     03/09/23 2153   INR 0.97     Recent Labs     03/09/23 2153   TROPONINI <0.01       Urinalysis:      Lab Results   Component Value Date/Time    NITRU Negative 03/10/2023 12:25 AM    WBCUA 6-9 03/10/2023 12:25 AM    BACTERIA Rare 03/10/2023 12:25 AM    RBCUA 0-2 03/10/2023 12:25 AM    BLOODU Negative 03/10/2023 12:25 AM    SPECGRAV 1.010 03/10/2023 12:25 AM    GLUCOSEU >=1000 03/10/2023 12:25 AM       Radiology:     CXR: I have reviewed the CXR with the following interpretation:   No acute process  EKG:  I have reviewed the EKG with the following interpretation:   Normal sinus rhythm QTc 439  XR CHEST PORTABLE   Final Result   No acute abnormality. CTA HEAD NECK W CONTRAST   Final Result   No large vessel occlusion in the head or neck. This scan was analyzed using Viz. ai contact LVO. Identification of suspected   findings is not for diagnostic use beyond notification. Viz LVO is limited to   analysis of imaging data and should not be used in-lieu of full patient   evaluation or relied upon to make or confirm diagnosis. CT HEAD WO CONTRAST   Final Result   Addendum (preliminary) 1 of 1   ADDENDUM:   Stroke CT head findings were communicated by [facility] to Dr. Ursula Keane on   03/09/2023 at 10:24 p.m. Final   No acute intracranial abnormality. The findings were sent to the Radiology Results [address] at 10:24   pm on 3/9/2023 to be communicated to a licensed caregiver. MRI brain without contrast    (Results Pending)       ASSESSMENT AND PLAN:    Active Hospital Problems    Diagnosis Date Noted    Transient ischemic attack (TIA) [G45.9] 03/10/2023     Priority: Medium     Hand numbness: Mostly in bilaterally  No more suspicious of peripheral neuropathy  B12, folate, A1c pending  MRI ordered due to suspected TIA prior to arrival    Acute cystitis:  Patient noted pyuria on UA  Which could explain patient's symptoms  IV ceftriaxone    Type 2 Diabetes: Reviewed patient's medications.  Plan is to hold oral medications and continued with reduced home dose of long acting and Insulin sliding scale  Hyperlipidemia: Controlled on home Statin. Outpatient PCP follow up post-discharge  Essential Hypertension: Reviewed patient's medications and plan is to continue home medication  Class I obesity:Complicating assessment and treatment. Placing patient at risk for multiple co-morbidities as well as early death and contributing to the patient's presentation. Education, and counseling        Face-to-face discussion with the primary ER physician in regards to symptoms, history, physical exam, diagnosis and treatment, collaborative decision was to admit the patient.     Diet: advance as tolerated    DVT Prophylaxis: lovenox    Dispo:   Expected LOS of two days         Chema Shepherd DO

## 2023-03-10 NOTE — DISCHARGE INSTR - DIET

## 2023-03-10 NOTE — CONSULTS
Pharmacy Note  Pharmacy to change base solutions Consult      Pharmacy has been asked by Dr. Kinsey Conrad to review this patient's medication profile to evaluate IV medications and change all base solutions to 0.9% sodium chloride if possible based on compatibility and product availability. This profile review will include an assessment of total IV fluids being administered to the patient. If a current order exists for continuous infusion of non-hypertonic plain IV fluid, the pharmacist will contact the prescriber to recommend the discontinuation of the IV fluid order. The following intermittent IV drips/infusions have been adjusted to saline: N/A    The following medications must remain in D5W due to incompatibility with normal saline:        Amphotericin    Mycophenolate    Nitroprusside                Penicillin G Potassium    Please be aware that the patient may have D5W ordered as part of hypoglycemia orderset. Total IV fluid delivered to patient over last 24h: N/A    Pharmacy will follow daily to ensure all new IVPBs + drips are in NS. Please call with questions.   Thank you,     Brianna Stubbs, PharmD 3/9/2023  10:13 PM

## 2023-03-10 NOTE — PROGRESS NOTES
Occupational Therapy/Physical Therapy    Pts chart reviewed and pt pleasant on arrival. Pt ambulating around room with I, witnessed by therapist. Pt reports symptoms have resolved and she is not experiencing functional deficits. Pt declines therapy services at this time and demos no acute needs.    Thank you,    Lara Smith, OTR/L  EMELY SanchezT

## 2023-03-10 NOTE — PROGRESS NOTES
Hospitalist Progress Note    Patient was admitted overnight by fam. Patient seen and examined by me, agree with assessment and plan as outlined in H&P.     Electronically signed by BRIE Wright CNP on 3/10/23 at 8:53 AM EST

## 2023-03-10 NOTE — PROGRESS NOTES
Speech Language Pathology  Clinical Bedside Swallow Assessment  Facility/Department: [facility] C5 - MED SURG/ORTHO        Recommendations:  Diet recommendation: IDDSI 7 Regular Solids; IDDSI 0 Thin Liquids; Meds PO as tolerated  Instrumentation: not clinically indicated  Risk management: upright for all intake, stay upright for at least 30 mins after intake, oral care 2-3x/day to reduce adverse affects in the event of aspiration, increase physical mobility as able, slow rate of intake, general GERD precautions, general aspiration precautions, and hold PO and contact SLP if s/s of aspiration or worsening respiratory status develop. Rodney Muñoz  : 1960 (64 y.o.)   MRN: [id number]  ROOM: [address] [place] [address]  ADMISSION DATE: 3/9/2023  PATIENT DIAGNOSIS(ES): TIA (transient ischemic attack) [G45.9]  Transient ischemic attack (TIA) [G45.9]  Acute intractable headache, unspecified headache type [R51.9]  Chief Complaint   Patient presents with    Headache     Pt states on Tuesday had similar symptoms. Pt states pcp today pt to come be seen in the ER if symptoms happened again. Pt states around  got a headache with right hand numbness. Pt states when it began she felt like she was having terrible finding her words but that has resolved since.        Patient Active Problem List    Diagnosis Date Noted    Transient ischemic attack (TIA) 03/10/2023    Hyperlipidemia associated with type 2 diabetes mellitus ([facility] [address] 10/11/2017    Controlled type 2 diabetes mellitus without complication, without long-term current use of insulin ([facility] [address] 2017    Elevated LFTs 2017    Onychomycosis 2017     Past Medical History:   Diagnosis Date    Cancer ([address]     breast cancer    Depression     Diabetes mellitus ([address]     Hyperlipidemia associated with type 2 diabetes mellitus ([facility] [address] 10/11/2017    Hypertension     Uncontrolled type 2 diabetes mellitus without complication, without long-term current use of insulin 2017     Past Surgical History:   Procedure Laterality Date    BREAST LUMPECTOMY      x2     SECTION  2002    ENDOMETRIAL ABLATION      HYSTERECTOMY, VAGINAL  2008    OVARY REMOVAL Right 2008     Allergies   Allergen Reactions    Erythromycin      Not a true allergy, pt. Prefers to stay away from this Abx due to yeast infection during past use. Okay with zithromax per pt. DATE ONSET: 3/9/23    Date of Evaluation: 3/10/2023   Evaluating Therapist: Digna Ruvalcaba, SLP    Chart Reviewed: : [x] Yes [] No    Current Diet: ADULT DIET; Regular; 3 carb choices (45 gm/meal); Low Fat/Low Chol/High Fiber/ANJU; Low Sodium (2 gm)    Recent Chest Radiography: [x] Chest XR   [] CT of Chest  Date: 3/9/23  Impressions  Impression   No acute abnormality. Pain: none reported    Reason for Referral  Joanna Alexander was referred for a bedside swallow evaluation to assess the efficiency of their swallow function, identify signs and symptoms of aspiration and make recommendations regarding safe dietary consistencies, effective compensatory strategies, and safe eating environment. Assessment    Medical record review/interview: Per MD H&P: \"\"61 y.o. 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 Patient reported that she was typing and she was not typing the right words and different position. Patient reported talking to people because she knew that if she talks it will come out wrong. Patient left leg in the car had another episode today and was told by her friend that she needs to come into the hospital to get further evaluation.   Patient otherwise reports no known alleviating or exacerbating factor no history of fever chills nausea vomiting chest pain abdominal pain or dysuria. \"\"      Predisposing dysphagia risk factors: N/A  Clinical signs of possible chronic dysphagia: N/A  Precipitating dysphagia risk factors: acute neurological event    Patient Complaints:  Odynophagia: [] Yes [x] No  Globus Sensation: [] Yes [x] No  SOB with PO intake: [] Yes [x] No  Increased WOB with PO intake: [] Yes [x] No  Reflux Sx's: [] Yes [x] No  Weight loss: [] Yes [x] No  Coughing/Choking with PO intake: [] Yes [x] No  Reduced Appetite: [] Yes [x] No    Vitals/labs:   Temp: N/A  SpO2: 95%  RR: 16  BP: 148/80  HR: 72  O2 device: RA    CBC:   Recent Labs     03/10/23  0606   WBC 8.1   HGB 12.6         BMP:  Recent Labs     03/10/23  0606      K 4.4      CO2 26   BUN 13   CREATININE 0.7   GLUCOSE 151*          Cranial nerve exam:   CN V (trigeminal): ophthalmic, maxillary, and mandibular facial sensation- WFL  CN VII (facial): WFL  CN IX/X (glossopharyngeal/vagus): MPT: DNT;  pitch range: DNT; vocal quality: WFL; cough: Strong-perceptually  CN XII (hypoglossal): WFL    Laryngeal function exam:   Secretions: N/A  Vocal quality: See CN exam above  MPT: See CN exam above  S/Z ratio: DNT  Pitch range: See CN exam above  Cough: See CN exam above    Oral Care Status:    [x] Oral Care [facility]  [] Poor oral care status  [] Edentulous  [] Upper Dentures  [] Lower Dentures  [] Missing/Broken Teeth  [] Evidence of dental cavities/carries    PO trials:   IDDSI 0 (thin): via cup and straw; no overt s/s aspiration    IDDSI 7 (regular): no overt s/s aspiration    3 oz water: PASS    Impressions:  RN okayed SLP entry. Pt oriented x4. Pt denies speech, lang, and cog concerns. All appear WFL during conversation. Pt denies swallowing concerns; no overt s/s aspiration observed across all consistencies trialed. Recommend regular diet with thin liquids and meds PO as tolerated. No further needs from ST.     Recommendations:  Diet recommendation: IDDSI 7 Regular Solids; IDDSI 0 Thin Liquids; Meds PO as tolerated  Instrumentation: not clinically indicated  Risk management: upright for all intake, stay upright for at least 30 mins after intake, oral care 2-3x/day to reduce adverse affects in the event of aspiration, increase physical mobility as able, slow rate of intake, general GERD precautions, general aspiration precautions, and hold PO and contact SLP if s/s of aspiration or worsening respiratory status develop. Prognosis: Good    Recommended Intervention: No further ST intervention indicated                                             Dysphagia Therapeutic Intervention: N/A    Referrals:  [] ENT    [] PT  [] Pulmonology [] GI  [] Neurology  [] RD  [] OT   []     Goals: N/A    Treatment:  Skilled instruction completed with patient re: evidenced based practice regarding recommendations and POC, importance of oral care to reduce adverse affects in the event of aspiration, and instruction of recommended compensatory strategies developed based upon clinical exam. Pt able to recall/demonstrate compensatory strategies with min cues. Pt Education: SLP educated the patient re: Role of SLP, rationale for completion of assessment, results of assessment, and recommendations  Pt Education Response: verbalized understanding    Duration/Frequency of Tx: No further ST intervention indicated    Individuals Consulted:   [x]  Patient     []  NP         []  RN   []  RD                   []  MD      []  Family Member                        []  PA    []  Other:      Safety Devices / Report:  [x]  All fall risk precautions in place []  Safety handoff completed with RN  [x]  Bed alarm in place  [x]  Left in bed     []  Chair alarm in place  []  Left in chair   [x]  Call light in reach   []  Other: Total Treatment Time / Charges       Time in Time out Total Time / units   Swallow Eval/Tx Time  0906 0914 8 min / 1 unit     Signature:  Tk Suresh M.A. [address] [facility]  Speech Language Pathologist

## 2023-03-10 NOTE — CONSULTS
2156- CT called  2157-  stroke team called  Per   RE R arm weakness, R arm and face numbness, speech difficulty, all resolved  2158- Lab notified  2200- Yvrose Castellanos returned page

## 2023-03-10 NOTE — DISCHARGE INSTR - COC
Continuity of Care Form    Patient Name: Braxton Higginbotham   :  1960  MRN:  [id number]    Admit date:  3/9/2023  Discharge date:  03/10/23    Code Status Order: Full Code   Advance Directives:     Admitting Physician:  Malena Joseph DO  PCP: Lorelei Montano, APRN - CNP    Discharging Nurse: [facility] Unit/Room#: 3575/0319-29  Discharging Unit Phone Number: ***    Emergency Contact:   Extended Emergency Contact Information  Primary Emergency Contact: [address] [place] [address] Phone: [phone]  Relation: Child    Past Surgical History:  Past Surgical History:   Procedure Laterality Date    BREAST LUMPECTOMY      x2     SECTION  2002    ENDOMETRIAL ABLATION      HYSTERECTOMY, VAGINAL  2008    OVARY REMOVAL Right 2008       Immunization History:   Immunization History   Administered Date(s) Administered    COVID-19, PFIZER PURPLE top, DILUTE for use, (age 15 y+), 30mcg/0.3mL 2021, 2021    Influenza Virus Vaccine 2017    Pneumococcal Polysaccharide (Jbilzornz44) 2017    Tdap (Boostrix, Adacel) 2017       Active Problems:  Patient Active Problem List   Diagnosis Code    Onychomycosis B35.1    Controlled type 2 diabetes mellitus without complication, without long-term current use of insulin ([facility] [address] E11.9    Elevated LFTs R79.89    Hyperlipidemia associated with type 2 diabetes mellitus ([facility] [address] E11.69, E78.5    Transient ischemic attack (TIA) G45.9       Isolation/Infection:   Isolation            No Isolation          Patient Infection Status       None to display            Nurse Assessment:  Last Vital Signs: BP (!) 148/80   Pulse 76   Temp 97.8 °F (36.6 °C) (Oral)   Resp 16   Ht 4' 11\" (1.499 m)   Wt 152 lb 9.6 oz (69.2 kg)   SpO2 97%   BMI 30.82 kg/m²     Last documented pain score (0-10 scale): Pain Level: 0  Last Weight:   Wt Readings from Last 1 Encounters:   03/10/23 152 lb 9.6 oz (69.2 kg)     Mental Status:  {IP PT MENTAL STATUS:78750}    IV Access:  { SILVIA IV ACCESS:014415068}    Nursing Mobility/ADLs:  Walking   {CHP DME ELRH:142033466}  Transfer  {P DME RCBS:460602238}  Bathing  {CHP DME BYWY:447434848}  Dressing  {CHP DME VHWR:755990612}  Toileting  {CHP DME OSIL:114020844}  Feeding  {CHP DME SBYQ:283004988}  Med Admin  {[facility] DME KAAY:442926787}  Med Delivery   { SILVIA MED Delivery:313078864}    Wound Care Documentation and Therapy:        Elimination:  Continence: Bowel: {YES / WD:19858}  Bladder: {YES / S}  Urinary Catheter: {Urinary Catheter:280210958}   Colostomy/Ileostomy/Ileal Conduit: {YES / DK:87070}       Date of Last BM: ***    Intake/Output Summary (Last 24 hours) at 3/10/2023 1007  Last data filed at 3/10/2023 0851  Gross per 24 hour   Intake 120 ml   Output --   Net 120 ml     No intake/output data recorded.     Safety Concerns:     [address] Safety Concerns:366149714}    Impairments/Disabilities:      [address] Impairments/Disabilities:749239463}    Nutrition Therapy:  Current Nutrition Therapy:   [address] [employer] Diet List:487871347}    Routes of Feeding: {[facility] DME Other Feedings:249057994}  Liquids: {Slp liquid thickness:09671}  Daily Fluid Restriction: {[facility] DME Yes amt example:885093253}  Last Modified Barium Swallow with Video (Video Swallowing Test): {Done Not Done WDZZ:977959922}    Treatments at the Time of Hospital Discharge:   Respiratory Treatments: ***  Oxygen Therapy:  {Therapy; copd oxygen:28298}  Ventilator:    {[facility] Vent OGXH:258013609}    Rehab Therapies: {THERAPEUTIC INTERVENTION:7425959999}  Weight Bearing Status/Restrictions: [address] [employer] Weight Bearin}  Other Medical Equipment (for information only, NOT a DME order):  {EQUIPMENT:462328906}  Other Treatments: ***    Patient's personal belongings (please select all that are sent with patient):  {[facility] DME Belongings:809097066}    RN SIGNATURE:  {Esignature:143712028}    CASE MANAGEMENT/SOCIAL WORK SECTION    Inpatient Status Date: 23    Readmission Risk Assessment Score:  Readmission Risk              Risk of Unplanned Readmission:  0       / signature: Electronically signed by Nena Pruitt RN on 3/10/23 at 1:02 PM EST    PHYSICIAN SECTION    Prognosis: {Prognosis:7191455069}    Condition at Discharge: Sunday Wiseman Patient Condition:321870691}    Rehab Potential (if transferring to Rehab): {Prognosis:7375757333}    Recommended Labs or Other Treatments After Discharge: ***    Physician Certification: I certify the above information and transfer of Clementine Liang  is necessary for the continuing treatment of the diagnosis listed and that she requires {Admit to Appropriate Level of Care:57218} for {GREATER/LESS:664829468} 30 days.      Update Admission H&P: {CHP DME Changes in PCDLZ:418255449}    PHYSICIAN SIGNATURE:  {Esignature:925170734}

## 2023-03-10 NOTE — PROGRESS NOTES
Patient was discharged home and escorted off the unit by wheelchair. Vital signs and assessment are stable at the time of discharge. Discharge instructions were explained to patient and AVS was signed. IV was removed. Tele monitor returned to the . Patient has no complaints at the time of discharge.

## 2023-03-10 NOTE — ED NOTES
Pt transported to  with all belongings in stable condition at this time     Tigre Contreras RN  03/10/23 9340

## 2023-03-10 NOTE — CARE COORDINATION
Case Management Assessment  Initial Evaluation    Date/Time of Evaluation: 3/10/2023 12:28 PM  Assessment Completed by: Brannon Chaudhari RN    If patient is discharged prior to next notation, then this note serves as note for discharge by case management. Patient Name: Lucy López                   YOB: 1960  Diagnosis: TIA (transient ischemic attack) [G45.9]  Transient ischemic attack (TIA) [G45.9]  Acute intractable headache, unspecified headache type [R51.9]                   Date / Time: 3/9/2023  9:45 PM    Patient Admission Status: Observation   Readmission Risk (Low < 19, Mod (19-27), High > 27): No data recorded  Current PCP: BRIE Haque CNP  PCP verified by CM? Yes    Chart Reviewed: Yes      History Provided by: Patient  Patient Orientation: Alert and Oriented    Patient Cognition: Alert    Hospitalization in the last 30 days (Readmission):  No    If yes, Readmission Assessment in CM Navigator will be completed. Advance Directives:      Code Status: Full Code   Patient's Primary Decision Maker is: Legal Next of Kin    Primary Decision MakerSanna Fraction - Child - [phone]    Discharge Planning:    Patient lives with:   Type of Home: (P) House  Primary Care Giver: Self  Patient Support Systems include: (P) Children   Current Financial resources: (P) Other (Comment) (Has commerical insurance)  Current community resources: (P) None  Current services prior to admission: (P) None            Current DME:              Type of Home Care services:  (P) None    ADLS  Prior functional level: Independent in ADLs/IADLs  Current functional level:  Independent in ADLs/IADLs    PT AM-PAC:   /24  OT AM-PAC:   /24    Family can provide assistance at DC: (P) Yes  Would you like Case Management to discuss the discharge plan with any other family members/significant others, and if so, who? (P) Yes (Jessica Pineda)  Plans to Return to Present Housing: Yes  Other Identified Issues/Barriers to RETURNING to current housing: none  Potential Assistance needed at discharge: (P) N/A            Potential DME:    Patient expects to discharge to: (P) [facility] for transportation at discharge:      Financial    Payor: Sarah Rodriguez / Plan: Sarah Rodriguez / Product Type: *No Product type* /     Does insurance require precert for SNF: Yes    Potential assistance Purchasing Medications: (P) No  [id number] [address]  Phone: [phone] Fax: [phone]      Notes:    Factors facilitating achievement of predicted outcomes: Family support, Cooperative, and Pleasant    Barriers to discharge: Echo and lab results    Additional Case Management Notes: Patient independent PLOF w/o AD. Lives with daughter. Patient denies DCP needs. The Plan for Transition of Care is related to the following treatment goals of TIA (transient ischemic attack) [G45.9]  Transient ischemic attack (TIA) [G45.9]  Acute intractable headache, unspecified headache type [C68.8]    IF APPLICABLE: The Patient and/or patient representative Yuliana Barakat and her family were provided with a choice of provider and agrees with the discharge plan. Freedom of choice list with basic dialogue that supports the patient's individualized plan of care/goals and shares the quality data associated with the providers was provided to:     Patient Representative Name:       The Patient and/or Patient Representative Agree with the Discharge Plan?       Roberto Wolf RN  Case Management Department  Ph: [phone] Fax: [phone]

## 2023-03-10 NOTE — PLAN OF CARE
Bedside swallow evaluation completed. Mansoor Ruvalcaba M.A.  [id number] [facility]  Speech Language Pathologist

## 2023-03-10 NOTE — PLAN OF CARE
Problem: Discharge Planning  Goal: Discharge to home or other facility with appropriate resources  Outcome: Completed     Problem: Pain  Goal: Verbalizes/displays adequate comfort level or baseline comfort level  Outcome: Completed  Flowsheets (Taken 3/10/2023 0851 by Cayetano Dakin, RN)  Verbalizes/displays adequate comfort level or baseline comfort level: Encourage patient to monitor pain and request assistance     Problem: Chronic Conditions and Co-morbidities  Goal: Patient's chronic conditions and co-morbidity symptoms are monitored and maintained or improved  Outcome: Completed

## 2023-03-10 NOTE — DISCHARGE SUMMARY
Hospital Medicine Discharge Summary    Patient ID: Jason Hurtado      Patient's PCP: BRIE Toney CNP    Admit Date: 3/9/2023     Discharge Date: 3/10/23    Admitting Provider: Patrick Adames DO     Discharge Provider: Erich Prader, APRN - CNP     Discharge Diagnoses: Active Hospital Problems    Diagnosis     Transient ischemic attack (TIA) [G45.9]      Priority: Medium       The patient was seen and examined on day of discharge and this discharge summary is in conjunction with any daily progress note from day of discharge. Hospital Course: 58 y.o. female who presented to [facility] with past medical history of cancer, type 2 diabetes, hyperlipidemia, hypertension presented to the ED due to numbness     Patient reports that she works in [employer] on Tuesday, while working in [employer] noticed that she has been having her digits numbness bilaterally normal arrhythmic factor. Patient reported that she was typing and she was not typing the right words and different position. Patient reported talking to people because she knew that if she talks it will come out wrong. Patient left leg in the car had another episode today and was told by her friend that she needs to come into the hospital to get further evaluation. Patient otherwise reports no known alleviating or exacerbating factor no history of fever chills nausea vomiting chest pain abdominal pain or dysuria. Stroke like symptoms. Presented with bilateral hand tingling, and difficulty finding her words. Differentials include: TIA vs CVA vs migraine. CT head, no acute intracranial abnormalities. CTA head neck, no LVO. MRI with no evidence of acute infarct. TSH noted to be 2.91 this admission. Vit B12/folate in process at time of discharge. Echo this admission: EF 55-60%; no regional WMAs; G1 DD, normal filling pressure; no evidence of shunting    Acute cystitis. UA small leuk esterase, rare bacteria, 6-9 WBCs.  Ceftriaxone during admission, will complete course of keflex on discharge. Diabetes type II. A1C noted to be 7.1 in 10/2017. Repeat ordered, not resulted at time of discharge. Held home regimen while admitted, resume on discharge. Basal bolus and sliding scale insulin, monitored and adjusted as needed. Recommend carb control diet. Recommend follow up with PCP for further management. Hyperlipidemia, controlled. LDL noted to be 73 this admission. Increased to high-intensity statin on admission. Recommend repeat blood work with PCP in 3-4 months to further guide therapy    Essential hypertension. Continue home medications. Recommend monitoring BP at home, and keeping a log to further discuss treatment with PCP    Obesity. BMI 31.51. This complicates assessment and treatment. Placing patient at risk for multiple co-morbidities as well as early death and contributing to the patient's presentation. Counseled on weight loss      Physical Exam Performed:     /84   Pulse 76   Temp 98.2 °F (36.8 °C) (Oral)   Resp 16   Ht 4' 11\" (1.499 m)   Wt 152 lb 9.6 oz (69.2 kg)   SpO2 96%   BMI 30.82 kg/m²     General appearance: resting comfortably in bed. No apparent distress, appears stated age and cooperative. HEENT: Normal cephalic, atraumatic without obvious deformity. Pupils equal, round, and reactive to light. Extra ocular muscles intact. Conjunctivae/corneas clear. Neck: Supple, with full range of motion. No jugular venous distention. Trachea midline. Respiratory: Normal respiratory effort. Clear to auscultation, bilaterally without Rales/Wheezes/Rhonchi. Room air  Cardiovascular: Regular rate and rhythm with normal S1/S2 without murmurs, rubs or gallops. Abdomen: Soft, non-tender, non-distended with normal bowel sounds. Musculoskeletal: No clubbing, cyanosis or edema bilaterally. Full range of motion without deformity. Skin: Skin color, texture, turgor normal. No rashes or lesions.   Neurologic: Neurovascularly intact without any focal sensory/motor deficits. Cranial nerves: II-XII intact, grossly non-focal.  Psychiatric: Alert and oriented, thought content appropriate, normal insight  Capillary Refill: Brisk,< 3 seconds   Peripheral Pulses: +2 palpable, equal bilaterally       Labs: For convenience and continuity at follow-up the following most recent labs are provided:      CBC:    Lab Results   Component Value Date/Time    WBC 8.1 03/10/2023 06:06 AM    HGB 12.6 03/10/2023 06:06 AM    HCT 37.3 03/10/2023 06:06 AM     03/10/2023 06:06 AM       Renal:    Lab Results   Component Value Date/Time     03/10/2023 06:06 AM    K 4.4 03/10/2023 06:06 AM     03/10/2023 06:06 AM    CO2 26 03/10/2023 06:06 AM    BUN 13 03/10/2023 06:06 AM    CREATININE 0.7 03/10/2023 06:06 AM    CALCIUM 9.5 03/10/2023 06:06 AM       Significant Diagnostic Studies    Radiology:   MRI brain without contrast   Final Result   1. No acute intracranial abnormality. Specifically, no evidence of acute   infarct. 2. Involutional parenchymal changes with moderate microvascular ischemic   disease. XR CHEST PORTABLE   Final Result   No acute abnormality. CTA HEAD NECK W CONTRAST   Final Result   No large vessel occlusion in the head or neck. This scan was analyzed using Viz. ai contact LVO. Identification of suspected   findings is not for diagnostic use beyond notification. Viz LVO is limited to   analysis of imaging data and should not be used in-lieu of full patient   evaluation or relied upon to make or confirm diagnosis. CT HEAD WO CONTRAST   Final Result   Addendum (preliminary) 1 of 1   ADDENDUM:   Stroke CT head findings were communicated by [facility] to Dr. Shilo Casillas on   03/09/2023 at 10:24 p.m. Final   No acute intracranial abnormality. The findings were sent to the Radiology Results [address] at 10:24   pm on 3/9/2023 to be communicated to a licensed caregiver. Consults:     IP CONSULT TO STROKE TEAM  IP CONSULT TO PHARMACY  PHARMACY TO CHANGE BASE FLUIDS    Disposition: home     Condition at Discharge: Stable    Discharge Instructions/Follow-up:  Follow up with PCP in 7-10 days. Code Status:Full Code     Activity: activity as tolerated    Diet: low fat, low cholesterol diet      Discharge Medications:     Current Discharge Medication List             Details   aspirin 81 MG EC tablet Take 1 tablet by mouth daily  Qty: 30 tablet, Refills: 3      atorvastatin (LIPITOR) 80 MG tablet Take 1 tablet by mouth nightly  Qty: 30 tablet, Refills: 3      cephALEXin (KEFLEX) 500 MG capsule Take 1 capsule by mouth 2 times daily for 7 days  Qty: 14 capsule, Refills: 0                Details   anastrozole (ARIMIDEX) 1 MG tablet Take 1 mg by mouth daily      naproxen (NAPROSYN) 500 MG tablet Take 1 tablet by mouth 2 times daily (with meals)  Qty: 60 tablet, Refills: 5      sitaGLIPtin-metFORMIN (JANUMET XR)  MG TB24 per extended release tablet Take 2 tablets by mouth daily  Qty: 60 tablet, Refills: 5    Comments: Please CANCEL other EXISTING REFILLS for this medication on file. Associated Diagnoses: Uncontrolled type 2 diabetes mellitus without complication, without long-term current use of insulin      lisinopril (PRINIVIL;ZESTRIL) 5 MG tablet Take 1 tablet by mouth daily  Qty: 90 tablet, Refills: 3    Associated Diagnoses: Uncontrolled type 2 diabetes mellitus without complication, without long-term current use of insulin      FREESTYLE LANCETS MISC 1 each by Does not apply route daily  Qty: 100 each, Refills: 3      Blood Glucose Monitoring Suppl (FREESTYLE FREEDOM LITE) W/DEVICE KIT 1 kit by Does not apply route daily as needed (prn)  Qty: 1 kit, Refills: 0      glucose blood VI test strips (ASCENSIA AUTODISC VI;ONE TOUCH ULTRA TEST VI) strip 1 each by In Vitro route daily As needed.   Qty: 100 each, Refills: 3             Time Spent on discharge: 33 minutes in the examination, evaluation, counseling and review of medications and discharge plan. Signed:    BRIE Cline CNP   3/10/2023      Thank you BRIE MAS CNP for the opportunity to be involved in this patient's care. If you have any questions or concerns, please feel free to contact me at [phone].

## 2023-03-10 NOTE — ED PROVIDER NOTES
[facility] C5 - MED [facility]  EMERGENCY DEPARTMENT ENCOUNTER        Pt Name: Margy Mojica  MRN: [id number]  [place] 1960  Date of evaluation: 3/9/2023  Provider: Candy Caraballo MD  PCP: BRIE Farias CNP  Note Started: 6:38 AM EST 3/10/23    CHIEF COMPLAINT       Chief Complaint   Patient presents with    Headache     Pt states on Tuesday had similar symptoms. Pt states pcp today pt to come be seen in the ER if symptoms happened again. Pt states around  got a headache with right hand numbness. Pt states when it began she felt like she was having terrible finding her words but that has resolved since. Headache, focal weakness, word finding difficulty    HISTORY OF PRESENT ILLNESS: 1 or more Elements     History from : Patient    Limitations to history : None    Margy Mojica is a 58 y.o. female who presents with headache, focal weakness, focal sensory change that occurred just prior to arrival.  Patient states this happened approximately 1 hour prior to arrival, on Tuesday had similar symptoms to this. Describes a sensory change as numbness in her right upper extremity, right face, felt as though she had weakness in the right arm prior to arrival, had difficulty getting words out. The symptoms have resolved, her symptoms are associated with a headache which is still present. She has no visual changes, nausea, vomiting, diarrhea, chest pain, fevers. No facial droop. No difficulty with speech currently. No confusion, fully oriented, this is confirmed by family at bedside. Symptoms otherwise not alleviated or exacerbated by other factors. Nursing Notes were all reviewed and agreed with or any disagreements were addressed in the HPI. REVIEW OF SYSTEMS :      Positives and Pertinent negatives as per HPI. ROS otherwise unremarkable.     SURGICAL HISTORY     Past Surgical History:   Procedure Laterality Date    BREAST LUMPECTOMY      x2     SECTION      ENDOMETRIAL ABLATION      HYSTERECTOMY, VAGINAL  2008    OVARY REMOVAL Right 2008       CURRENTMEDICATIONS       Current Discharge Medication List        CONTINUE these medications which have NOT CHANGED    Details   anastrozole (ARIMIDEX) 1 MG tablet Take 1 mg by mouth daily      naproxen (NAPROSYN) 500 MG tablet Take 1 tablet by mouth 2 times daily (with meals)  Qty: 60 tablet, Refills: 5      sitaGLIPtin-metFORMIN (JANUMET XR)  MG TB24 per extended release tablet Take 2 tablets by mouth daily  Qty: 60 tablet, Refills: 5    Comments: Please CANCEL other EXISTING REFILLS for this medication on file. Associated Diagnoses: Uncontrolled type 2 diabetes mellitus without complication, without long-term current use of insulin      lovastatin (MEVACOR) 10 MG tablet Take 1 tablet by mouth nightly  Qty: 90 tablet, Refills: 3    Associated Diagnoses: Uncontrolled type 2 diabetes mellitus without complication, without long-term current use of insulin      lisinopril (PRINIVIL;ZESTRIL) 5 MG tablet Take 1 tablet by mouth daily  Qty: 90 tablet, Refills: 3    Associated Diagnoses: Uncontrolled type 2 diabetes mellitus without complication, without long-term current use of insulin      FREESTYLE LANCETS MISC 1 each by Does not apply route daily  Qty: 100 each, Refills: 3      Blood Glucose Monitoring Suppl (FREESTYLE FREEDOM LITE) W/DEVICE KIT 1 kit by Does not apply route daily as needed (prn)  Qty: 1 kit, Refills: 0      glucose blood VI test strips (ASCENSIA AUTODISC VI;ONE TOUCH ULTRA TEST VI) strip 1 each by In Vitro route daily As needed.   Qty: 100 each, Refills: 3             ALLERGIES     Erythromycin    FAMILYHISTORY       Family History   Problem Relation Age of Onset    Arthritis Mother     Stroke Mother     Cancer Father         lung     Cancer Sister         ovarian     Emphysema Maternal Grandfather     Early Death Paternal Grandfather         bad whisky    Diabetes Maternal Cousin     Cancer Sister         breast Diabetes Maternal Cousin         SOCIAL HISTORY       Social History     Tobacco Use    Smoking status: Never    Smokeless tobacco: Never   Substance Use Topics    Alcohol use: Yes     Comment: once or twice month    Drug use: No       SCREENINGS   NIH Stroke Scale  Interval: Baseline  Level of Consciousness (1a): Alert  LOC Questions (1b): Answers both correctly  LOC Commands (1c): Performs both tasks correctly  Best Gaze (2): Normal  Visual (3): No visual loss  Facial Palsy (4): Normal symmetrical movement  Motor Arm, Left (5a): No drift  Motor Arm, Right (5b): No drift  Motor Leg, Left (6a): No drift  Motor Leg, Right (6b): No drift  Limb Ataxia (7): Absent  Sensory (8): Normal  Best Language (9): No aphasia  Dysarthria (10): Normal  Extinction and Inattention (11): No abnormality  Total: 0    [place] Coma Scale  Eye Opening: Spontaneous  Best Verbal Response: Oriented  Best Motor Response: Obeys commands  Benny Coma Scale Score: 15                CIWA Assessment  BP: 130/73  Heart Rate: 74           PHYSICAL EXAM  1 or more Elements     ED Triage Vitals   BP Temp Temp Source Heart Rate Resp SpO2 Height Weight   03/09/23 2200 03/09/23 2342 03/09/23 2342 03/09/23 2200 03/09/23 2200 03/09/23 2200 03/10/23 0057 03/10/23 0057   (!) 169/90 97.9 °F (36.6 °C) Oral 98 16 97 % 4' 11\" (1.499 m) 152 lb 9.6 oz (69.2 kg)           General: Alert, No acute distress. Eye: Normal conjunctiva. Sclera anicteric. PERRL. EOMI. No visual field cuts. No diplopia. Visual acuity within normal limits  HENT: Oral mucosa is moist.  Normocephalic, atraumatic. Neck: Supple, no rigidity. Respiratory: Respirations even and non-labored. Clear to auscultation bilaterally. Cardiovascular: Normal rate, Regular rhythm. Intact peripheral pulses. No edema. No JVD. Gastrointestinal: Non-distended. Soft, nontender. Musculoskeletal: No deformities  Integumentary: Warm, Dry. No rashes. Neurologic:   GCS 15.  AO x 4. CN II-XII intact. Speech clear, strength 5/5 bilateral upper and lower extremities. No drift. Sensation intact bilateral upper and lower extremities. No ataxia. No dysmetria. Negative test of skew. Steady gait. DIAGNOSTIC RESULTS   LABS:    Labs Reviewed   URINALYSIS WITH REFLEX TO CULTURE - Abnormal; Notable for the following components:       Result Value    Glucose, Ur >=1000 (*)     Leukocyte Esterase, Urine SMALL (*)     All other components within normal limits   COMPREHENSIVE METABOLIC PANEL - Abnormal; Notable for the following components:    Chloride 98 (*)     Glucose 207 (*)     All other components within normal limits   MICROSCOPIC URINALYSIS - Abnormal; Notable for the following components:    WBC, UA 6-9 (*)     Bacteria, UA Rare (*)     All other components within normal limits   POCT GLUCOSE - Abnormal; Notable for the following components:    POC Glucose 192 (*)     All other components within normal limits   CBC WITH AUTO DIFFERENTIAL   PROTIME-INR   TROPONIN   URINE DRUG SCREEN   CBC WITH AUTO DIFFERENTIAL   COMPREHENSIVE METABOLIC PANEL W/ REFLEX TO MG FOR LOW K   HEMOGLOBIN A1C   LIPID PANEL   TROPONIN   VITAMIN B12 & FOLATE   TSH WITH REFLEX TO FT4       When ordered only abnormal lab results are displayed. All other labs were within normal range or not returned as of this dictation. EKG: The Ekg interpreted by me shows  Rhythm NSR  Rate of 84 bpm  Axis is  normal  Intervals and durations are unremarkable. ST Segments: normal, no ST elevation. Possible remote inferior infarct, similar Q waves on prior EKG dated August 22, 2020  Compared to prior EKG dated August 22, 2020, no significant change. RADIOLOGY:   Non-plain film images such as CT, Ultrasound and MRI are read by the radiologist. Plain radiographic images are visualized and preliminarily interpreted by the ED Provider with the below findings:    No acute abnormalities.     Interpretation per the Radiologist below, if available at the time of this note:    XR CHEST PORTABLE   Final Result   No acute abnormality. CTA HEAD NECK W CONTRAST   Final Result   No large vessel occlusion in the head or neck. This scan was analyzed using Viz. ai contact LVO. Identification of suspected   findings is not for diagnostic use beyond notification. Viz LVO is limited to   analysis of imaging data and should not be used in-lieu of full patient   evaluation or relied upon to make or confirm diagnosis. CT HEAD WO CONTRAST   Final Result   Addendum (preliminary) 1 of 1   ADDENDUM:   Stroke CT head findings were communicated by [facility] to Dr. yM Solis on   03/09/2023 at 10:24 p.m. Final   No acute intracranial abnormality. The findings were sent to the Radiology Results [id number] at 10:24   pm on 3/9/2023 to be communicated to a licensed caregiver. MRI brain without contrast    (Results Pending)     CT HEAD WO CONTRAST    Addendum Date: 3/9/2023    ADDENDUM: Stroke CT head findings were communicated by [facility] to Dr. My Solis on 03/09/2023 at 10:24 p.m. Result Date: 3/9/2023  EXAMINATION: CT OF THE HEAD WITHOUT CONTRAST  3/9/2023 10:01 pm TECHNIQUE: CT of the head was performed without the administration of intravenous contrast. Automated exposure control, iterative reconstruction, and/or weight based adjustment of the mA/kV was utilized to reduce the radiation dose to as low as reasonably achievable. COMPARISON: CT head done 09/02/2015. HISTORY: ORDERING SYSTEM PROVIDED HISTORY: Stroke Symptoms TECHNOLOGIST PROVIDED HISTORY: Has a \"code stroke\" or \"stroke alert\" been called? ->Yes Reason for exam:->Stroke Symptoms Decision Support Exception - unselect if not a suspected or confirmed emergency medical condition->Emergency Medical Condition (MA) Reason for Exam: severe HA and tingling in hands Relevant Medical/Surgical History: no hx stroke FINDINGS: BRAIN/VENTRICLES: There is no acute intracranial hemorrhage, mass effect or midline shift. No abnormal extra-axial fluid collection. The gray-white differentiation is maintained without evidence of an acute infarct. There is prominence of the ventricles and sulci due to global parenchymal volume loss. There are nonspecific areas of hypoattenuation within the periventricular and subcortical white matter, which likely represent chronic microvascular ischemic change. ORBITS: The visualized portion of the orbits demonstrate no acute abnormality. SINUSES: The visualized paranasal sinuses and mastoid air cells demonstrate no acute abnormality. SOFT TISSUES/SKULL: No acute abnormality of the visualized skull or soft tissues. No acute intracranial abnormality. The findings were sent to the Radiology Results [id number] at 10:24 pm on 3/9/2023 to be communicated to a licensed caregiver. XR CHEST PORTABLE    Result Date: 3/9/2023  EXAMINATION: ONE XRAY VIEW OF THE CHEST 3/9/2023 10:18 pm COMPARISON: Left rib series August 2020. HISTORY: ORDERING SYSTEM PROVIDED HISTORY: R arm numbness TECHNOLOGIST PROVIDED HISTORY: Reason for exam:->R arm numbness Reason for Exam: numbness, headache FINDINGS: Single view provided. Stable normal mediastinal and cardiac silhouettes. Normal lung volumes with no interstitial edema or pulmonary mass. No acute consolidation or effusion. No pneumothorax or free subdiaphragmatic air. The visualized bowel gas pattern is normal.     No acute abnormality. CTA HEAD NECK W CONTRAST    Result Date: 3/9/2023  EXAMINATION: CTA OF THE HEAD AND NECK WITH CONTRAST 3/9/2023 10:05 pm: TECHNIQUE: CTA of the head and neck was performed with the administration of intravenous contrast. Multiplanar reformatted images are provided for review. MIP images are provided for review. Stenosis of the internal carotid arteries measured using NASCET criteria.  Automated exposure control, iterative reconstruction, and/or weight based adjustment of the mA/kV was utilized to reduce the radiation dose to as low as reasonably achievable. COMPARISON: None. HISTORY: ORDERING SYSTEM PROVIDED HISTORY: Stroke Symptoms TECHNOLOGIST PROVIDED HISTORY: Has a \"code stroke\" or \"stroke alert\" been called? ->Yes Reason for exam:->Stroke Symptoms Decision Support Exception - unselect if not a suspected or confirmed emergency medical condition->Emergency Medical Condition (MA) Reason for Exam: Tingling in hands and sever ha FINDINGS: CTA NECK: AORTIC ARCH/ARCH VESSELS: No dissection or arterial injury. No significant stenosis of the brachiocephalic or subclavian arteries. CAROTID ARTERIES: No dissection, arterial injury, or hemodynamically significant stenosis by NASCET criteria. VERTEBRAL ARTERIES: No dissection, arterial injury, or significant stenosis. SOFT TISSUES: The lung apices are clear. No cervical or superior mediastinal lymphadenopathy. The larynx and pharynx are unremarkable. No acute abnormality of the salivary and thyroid glands. BONES: No acute osseous abnormality. CTA HEAD: ANTERIOR CIRCULATION: Atherosclerosis in the bilateral intracranial internal carotid arteries results in up to 50% stenosis on the right and up to 25% stenosis on the left. No hemodynamically significant stenosis or occlusion in the proximal bilateral anterior or middle cerebral arteries. No aneurysm. POSTERIOR CIRCULATION: No significant stenosis of the vertebral, basilar, or posterior cerebral arteries. No aneurysm. OTHER: No dural venous sinus thrombosis on this non-dedicated study. BRAIN: No mass effect or midline shift. No extra-axial fluid collection. The gray-white differentiation is maintained. No large vessel occlusion in the head or neck. This scan was analyzed using Viz. ai contact LVO. Identification of suspected findings is not for diagnostic use beyond notification.  Viz LVO is limited to analysis of imaging data and should not be used in-lieu of full patient evaluation or relied upon to make or confirm diagnosis. CRITICAL CARE TIME   I independently provided 36 minutes of non-concurrent critical care out of the total shared critical care time provided, excluding separately reportable procedures. There was a high probability of clinically significant/life threatening deterioration in the patient's condition which required my urgent intervention. Reassessments of patient's hemodynamic status, neurologic status, discussion the patient's case with the neuroradiologist, stroke neurologist for evaluation of recent onset likely TIA. PAST MEDICAL HISTORY      has a past medical history of Cancer ([facility] [address] Depression, Diabetes mellitus ([facility] [address] Hyperlipidemia associated with type 2 diabetes mellitus ([facility] (10/11/2017), Hypertension, and Uncontrolled type 2 diabetes mellitus without complication, without long-term current use of insulin (4/21/2017). EMERGENCY DEPARTMENT COURSE and DIFFERENTIAL DIAGNOSIS/MDM:   Vitals:    Vitals:    03/10/23 0021 03/10/23 0027 03/10/23 0057 03/10/23 0330   BP:  (!) 179/87  130/73   Pulse: 85 83  74   Resp: 18 16  16   Temp:  97.9 °F (36.6 °C)  97.3 °F (36.3 °C)   TempSrc:  Oral  Oral   SpO2: 95% 97%  98%   Weight:   152 lb 9.6 oz (69.2 kg)    Height:   4' 11\" (1.499 m)        Patient was given the following medications:  Medications         iopamidol (ISOVUE-370) 76 % injection 75 mL (75 mLs IntraVENous Given 3/9/23 2213)   aspirin chewable tablet 324 mg (324 mg Oral Given 3/9/23 2335)   acetaminophen (TYLENOL) tablet 1,000 mg (1,000 mg Oral Given 3/9/23 2336)             Is this patient to be included in the SEP-1 Core Measure due to severe sepsis or septic shock?    No   Exclusion criteria - the patient is NOT to be included for SEP-1 Core Measure due to:  2+ SIRS criteria are not met    Chronic Conditions: Hypertension, hyperlipidemia, diabetes, depression    CONSULTS: (Who and What was discussed)  IP CONSULT TO STROKE TEAM  IP CONSULT TO PHARMACY  PHARMACY TO CHANGE BASE FLUIDS    Discussion with Other Profesionals : Admitting Team Dr. Rox Shelton who accepted admission. Social Determinants : None    Records Reviewed : None    CC/HPI Summary, DDx, ED Course, and Reassessment: Sabrina Light in 8 is a 59 yo F who p/w complaint of RUE weakness and numbness, difficulty getting words out, R facial numbness that occurred just prior to arrival, associated with headache. Numbness, weakness and speech problem resolved prior to arrival, pt still with mild headache, poss migraine but pt without significant h/o these, comorbidities DM, HTN, HLD, c/f TIA. Activated as code stroke on arrival to the emergency department given the presence of concern for TIA, symptoms started within the last 24 hours. CT brain, CTA brain and neck to evaluate for intracranial hemorrhage, stroke, large vessel occlusion. EKG to evaluate for potential malignant dysrhythmia, myocardial injury, evidence of heart strain, high-grade block. CMP-CMP was ordered to rule out electrolyte abnormalities, liver dysfunction, kidney dysfunction, electrolyte imbalance, abnormal transaminases, or any other pathology that might be causing the patient's symptoms. CBC-CBC was ordered to rule out anemia, infection, abnormal platelet count, polycythemia, abnormal Red cell pathology, or any other pathology that might be causing the patient's symptoms     Stroke neuro consulted, spoke with Dr. Melvina Herring who agrees no intervention other than ASA at this time which is given. NIHSS 0. Overall possible complex migraine versus TIA. Aspirin given. Acetaminophen with improvement of the patient's pain. Needs admission for further work-up, neurology evaluation, MRI, echo. Admitted to the hospitalist.  Verlinda Canavan up and so condition.         Disposition Considerations (tests considered but not done, Shared Decision Making, Pt Expectation of Test or Tx.): Admitted        I am the Primary Clinician of Record. FINAL IMPRESSION      1. TIA (transient ischemic attack)    2.  Acute intractable headache, unspecified headache type          DISPOSITION/PLAN     DISPOSITION Admitted 03/10/2023 12:07:59 AM         (Please note that portions of this note were completed with a voice recognition program.  Efforts were made to edit the dictations but occasionally words are mis-transcribed.)    Sandro Anders MD (electronically signed)            Sandro Anders MD  03/10/23 0561

## 2023-03-10 NOTE — CARE COORDINATION
CASE MANAGEMENT DISCHARGE SUMMARY      Discharge to: Home     Precertification completed: [id number] Exemption Notification (HENS) completed: n/a    IMM given: n/a    New Durable Medical Equipment ordered/agency: No needs    Transportation:    Family/car: Private       Confirmed discharge plan with:     Patient: yes     Family:  patient to notify           RN, name: Argentina Tarango RN    Note: Discharging nurse to complete SILVIA, reconcile AVS, and place final copy with patient's discharge packet. RN to ensure that written prescriptions for  Level II medications are sent with patient to the facility as per protocol.

## 2023-06-19 ENCOUNTER — TELEPHONE (OUTPATIENT)
Dept: ORTHOPEDIC SURGERY | Age: 63
End: 2023-06-19

## 2023-06-19 NOTE — TELEPHONE ENCOUNTER
I have spoken with the patient and educated her on how to adjust the brace. She come in and have it checked.

## 2023-06-22 ENCOUNTER — OFFICE VISIT (OUTPATIENT)
Dept: ORTHOPEDIC SURGERY | Age: 63
End: 2023-06-22
Payer: COMMERCIAL

## 2023-06-22 VITALS — BODY MASS INDEX: 31.45 KG/M2 | WEIGHT: 156 LBS | HEIGHT: 59 IN

## 2023-06-22 DIAGNOSIS — M25.562 LEFT KNEE PAIN, UNSPECIFIED CHRONICITY: Primary | ICD-10-CM

## 2023-06-22 PROCEDURE — 99213 OFFICE O/P EST LOW 20 MIN: CPT | Performed by: ORTHOPAEDIC SURGERY

## 2023-06-22 NOTE — PROGRESS NOTES
FOLLOW UP ORTHOPAEDIC NOTE    The patient follows up today for reevaluation of left knee injury. The patient states 6/10 pain. She is accompanied by her daughter. She has been wearing the knee brace although admits its been ill fitting and she had come in earlier in the week to get it adjusted. PE:  AAOx3  RR  Unlabored breathing  Skin warm and moist  Focused physical examination of the left knee  Resolving ecchymosis, very minimal tenderness to palpation along the medial epicondyle  Remainder of neurovascular exam unchanged    Pertinent radiographs/imaging:  AP left knee 6/22/2023: No significant displacement still roughly 2 mm of the medial epicondyle fracture segment suspected to be in association with the MCL. Diagnosis Orders   1. Left knee pain, unspecified chronicity  XR KNEE LEFT (1-2 VIEWS)          Assessment and plan: 58 female with continued subjective symptoms of left knee pain with known, correlating diagnosis of left knee medial epicondyle avulsion fracture. -Time of 12 minutes was spent coordinating and discussing the clinical findings and diagnostic imaging results as they pertain to the patient's presenting subjective symptoms.  -I had a pleasant discussion with the patient and her daughter. I reviewed with her that currently her radiographs are reasonably well-appearing and that she is roughly 13 days out from injury. She can start working on -10 to 60 degrees active range of motion when seated but otherwise when weightbearing, she should still lock the brace in full terminal knee extension for the first month after the injury using crutches and may now be weightbearing as tolerated. -Brace was refitted today by DME specialist  -I did review with her that I like to have her come back and see me in 1 week's time for repeat AP of the left knee to ensure there is been no gross interval change in the avulsion fracture position.   Currently this can be managed nonoperatively and I feel it

## 2023-06-29 ENCOUNTER — OFFICE VISIT (OUTPATIENT)
Dept: ORTHOPEDIC SURGERY | Age: 63
End: 2023-06-29

## 2023-06-29 VITALS — WEIGHT: 156 LBS | BODY MASS INDEX: 31.45 KG/M2 | HEIGHT: 59 IN

## 2023-06-29 DIAGNOSIS — M25.562 LEFT KNEE PAIN, UNSPECIFIED CHRONICITY: ICD-10-CM

## 2023-06-29 DIAGNOSIS — S72.492A AVULSION FRACTURE OF FEMORAL CONDYLE, LEFT, CLOSED, INITIAL ENCOUNTER (HCC): Primary | ICD-10-CM

## 2023-07-12 ENCOUNTER — HOSPITAL ENCOUNTER (OUTPATIENT)
Dept: PHYSICAL THERAPY | Age: 63
Setting detail: THERAPIES SERIES
Discharge: HOME | End: 2023-07-12
Payer: COMMERCIAL

## 2023-07-12 PROCEDURE — 97161 PT EVAL LOW COMPLEX 20 MIN: CPT | Performed by: PHYSICAL THERAPIST

## 2023-07-12 PROCEDURE — 97110 THERAPEUTIC EXERCISES: CPT | Performed by: PHYSICAL THERAPIST

## 2023-07-12 PROCEDURE — 97016 VASOPNEUMATIC DEVICE THERAPY: CPT | Performed by: PHYSICAL THERAPIST

## 2023-07-12 NOTE — FLOWSHEET NOTE
Ankle: transmalleolar      Ankle: figure 8      [] ICD 10: R60.9 Edema unspecified  [] ICD 10: R22.4 Localized swelling of lower limb  [x] ICD 10: R60.1 Generalized edema  [] ICD 10: R60.9 Edema unspecified      Charges  Timed Code Treatment Minutes: 20   Total Treatment Minutes: 60     Medicare total (add KX at $2000)         BWC time in/ time out:    TE time in /out    Manual time in/out    Neuro re ed time in/out    Untimed minutes        [x] EVAL (LOW) 11933   [] EVAL (MOD) 39524  [] EVAL (HIGH) 79469   [] RE-EVAL     [x] WP(53707) x   1  [] IONTO  [] NMR (34081) x     [x] VASO  [] Manual (09394) x      [] Other:  [] TA x      [] Mech Traction (66891)  [] ES(attended) (72219)      [] ES (un) (68167):     GOALS:  Patient stated goal: walk normally  [] Progressing: [] Met: [] Not Met: [] Adjusted     Therapist goals for Patient:   Short Term Goals: To be achieved in: 2 weeks  1. Independent in HEP and progression per patient tolerance, in order to prevent re-injury. [] Progressing: [] Met: [] Not Met: [] Adjusted  2. Patient will have a decrease in pain to facilitate improvement in movement, function, and ADLs as indicated by Functional Deficits. [] Progressing: [] Met: [] Not Met: [] Adjusted     Long Term Goals: To be achieved in: 6 weeks  1. Disability index score of 35% or more per LEFS to assist with reaching prior level of function. [] Progressing: [] Met: [] Not Met: [] Adjusted  2. Patient will demonstrate increased PROM to 0-130 to allow for proper joint functioning as indicated by patients Functional Deficits. [] Progressing: [] Met: [] Not Met: [] Adjusted  3. Patient will demonstrate an increase in Strength to good proximal hip strength and control, within 5lb HHD in LE to allow for proper functional mobility as indicated by patients Functional Deficits. [] Progressing: [] Met: [] Not Met: [] Adjusted  4.  Patient will return to normal ambulation on level surfaces without gait deviation

## 2023-07-18 ENCOUNTER — OFFICE VISIT (OUTPATIENT)
Dept: ORTHOPEDIC SURGERY | Age: 63
End: 2023-07-18
Payer: COMMERCIAL

## 2023-07-18 DIAGNOSIS — S72.492D: Primary | ICD-10-CM

## 2023-07-18 DIAGNOSIS — M25.562 LEFT KNEE PAIN, UNSPECIFIED CHRONICITY: ICD-10-CM

## 2023-07-18 PROCEDURE — 99213 OFFICE O/P EST LOW 20 MIN: CPT | Performed by: ORTHOPAEDIC SURGERY

## 2023-07-18 NOTE — PROGRESS NOTES
FOLLOW UP ORTHOPAEDIC NOTE    The patient follows up today for reevaluation of left knee injury. The patient states 1-2/10 pain. She has been going to physical therapy    PE:  AAOx3  RR  Unlabored breathing  Skin warm and moist  Focused physical examination of the left knee  Nontender to palpation along the medial epicondyle.  0 to 122 degrees active range of motion  Able to do straight leg raise  Trace opening at 30 degrees valgus without pain with firm endpoint, stable at 0 degrees valgus. Stable to varus at 0 and 30 degrees  No change in neurovascular examination    Pertinent radiographs/imaging:  AP left knee 7/18/2023: Incorporated and healed far medial epicondyle avulsion fracture     Diagnosis Orders   1. Avulsion fracture of femoral condyle, left, closed, with routine healing, subsequent encounter        2. Left knee pain, unspecified chronicity  XR KNEE LEFT (1-2 VIEWS)        Assessment and plan: 58 female with continued subjective symptoms of left knee injury with known, correlating diagnosis of left knee medial epicondyle femoral avulsion-minimal displaced.  -Time of 16 minutes was spent coordinating and discussing the clinical findings and diagnostic imaging results as they pertain to the patient's presenting subjective symptoms.  -I had a pleasant discussion with the patient today. I reviewed with her that currently her clinical examination is well-appearing and her radiographs continue to show interval healing.   At this time OTC Tylenol per bottle as needed discomfort  -Physical therapy will continue to work with the patient and wean from 2 crutch to 1 crutch 1 crutch to no crutch and then out of the brace likely over the next 2 to 3 weeks  -All questions answered to the patient's satisfaction and the patient expressed understanding and agreement with the above listed treatment plan  -Follow up in 6 weeks or so for 3-month post injury visit  -Thank you for the clinical consultation and allowing me

## 2023-07-19 ENCOUNTER — HOSPITAL ENCOUNTER (OUTPATIENT)
Dept: PHYSICAL THERAPY | Age: 63
Setting detail: THERAPIES SERIES
Discharge: HOME | End: 2023-07-19
Payer: COMMERCIAL

## 2023-07-19 PROCEDURE — 97110 THERAPEUTIC EXERCISES: CPT | Performed by: PHYSICAL THERAPIST

## 2023-07-19 PROCEDURE — 97112 NEUROMUSCULAR REEDUCATION: CPT | Performed by: PHYSICAL THERAPIST

## 2023-07-19 NOTE — FLOWSHEET NOTE
[address] [place] [address] and [address]  [address] [place] [address], [address] [place] [address]  Phone: [phone]  Fax [phone]    Physical Therapy Treatment Note/ Progress Report:           Date:  2023    Patient Name:  Mahnaz Patino    :  1960  MRN: [id number]  Restrictions/Precautions:    Medical/Treatment Diagnosis Information:  Avulsion fracture of femoral condyle, left, closed, initial encounter [facility]) Authur Idris  left knee pain, M25.562, left knee stifness M25.662, left leg weakness M62.82, difficulty walking not classified elsewhere R26.2, generalized edema R 02.4  Insurance/Certification information:  PT Insurance Information: [employer] ind ded met, family %6000 (met $4143), 90%,$0 copay. 60 visits, no auth  Physician Information:  Murphy Zepeda MD  Has the plan of care been signed (Y/N):        []  Yes  [x]  No     Date of Patient follow up with Physician: Dr. Jerrod Escamilla Sept      Is this a Progress Report:     []  Yes  [x]  No        If Yes:  Date Range for reporting period:  Beginning 23  Ending 23    Progress report will be due (10 Rx or 30 days whichever is less):        Recertification will be due (POC Duration  / 90 days whichever is less): 23      Visit # Insurance Allowable Auth Required   In-person 2 60 []  Yes [x]  No    Telehealth   []  Yes []  No    Total          Functional Scale: LEFS 50%   Date assessed:  23      Therapy Diagnosis/Practice Pattern:      Number of Comorbidities:  []0     []1-2    [x]3+    Latex Allergy:  [x]NO      []YES  Preferred Language for Healthcare:   [x]English       []other:      Pain level:  2-6/10     SUBJECTIVE:  Patient reports the HEP is going well. She saw Dr. Jerrod Escamilla since last session.      OBJECTIVE: See eval  Observation:   Test measurements:       Test used Initial score  23 Current Score   Pain Summary VAS 2-610    Functional questionnaire LEFS 50%    ROM Resting ext -5

## 2023-07-21 ENCOUNTER — HOSPITAL ENCOUNTER (OUTPATIENT)
Dept: PHYSICAL THERAPY | Age: 63
Setting detail: THERAPIES SERIES
Discharge: HOME | End: 2023-07-21
Payer: COMMERCIAL

## 2023-07-21 PROCEDURE — 97112 NEUROMUSCULAR REEDUCATION: CPT | Performed by: PHYSICAL THERAPIST

## 2023-07-21 PROCEDURE — 97016 VASOPNEUMATIC DEVICE THERAPY: CPT | Performed by: PHYSICAL THERAPIST

## 2023-07-21 PROCEDURE — 97110 THERAPEUTIC EXERCISES: CPT | Performed by: PHYSICAL THERAPIST

## 2023-07-21 NOTE — FLOWSHEET NOTE
towards functional goals listed. [] Progression is slowed due to complexities listed. [] Progression has been slowed due to co-morbidities. [x] Plan just implemented, too soon to assess goals progression  [] Other:         Overall Progression Towards Functional goals/ Treatment Progress Update:  [] Patient is progressing as expected towards functional goals listed. [] Progression is slowed due to complexities/Impairments listed. [] Progression has been slowed due to co-morbidities. [x] Plan just implemented, too soon to assess goals progression <30days   [] Goals require adjustment due to lack of progress  [] Patient is not progressing as expected and requires additional follow up with physician  [] Other    Prognosis for POC: [x] Good [] Fair  [] Poor      Patient requires continued skilled intervention: [x] Yes  [] No    Treatment/Activity Tolerance:  [x] Patient able to complete treatment  [] Patient limited by fatigue  [] Patient limited by pain    [] Patient limited by other medical complications  [] Other:     ASSESSMENT: Patient was able to tolerate increased activity today without pain. Her edema decrease is beginning to maintain gains. She is progressing as expected, and edema is decreasing. She requires skilled intervention of PT to reduce pain and swelling, restore normal gait, and restore functional mobility         PLAN: Cont Pt 1-2 times per week to progress activity  [x] Continue per plan of care [] Alter current plan (see comments above)  [] Plan of care initiated [] Hold pending MD visit [] Discharge      Electronically signed by:  Rose Singh PT    Note: If patient does not return for scheduled/ recommended follow up visits, this note will serve as a discharge from care along with most recent update on progress.

## 2023-07-26 ENCOUNTER — HOSPITAL ENCOUNTER (OUTPATIENT)
Dept: PHYSICAL THERAPY | Age: 63
Setting detail: THERAPIES SERIES
Discharge: HOME | End: 2023-07-26
Payer: COMMERCIAL

## 2023-07-26 PROCEDURE — 97110 THERAPEUTIC EXERCISES: CPT | Performed by: PHYSICAL THERAPIST

## 2023-07-26 NOTE — FLOWSHEET NOTE
[place] 37.5 36.8 37.2 cm   Knee: 5 cm above      Knee: 15 cm above      Ankle: transmalleolar      Ankle: figure 8      [] ICD 10: R60.9 Edema unspecified  [] ICD 10: R22.4 Localized swelling of lower limb  [x] ICD 10: R60.1 Generalized edema  [] ICD 10: R60.9 Edema unspecified      Charges  Timed Code Treatment Minutes: 35   Total Treatment Minutes: 40     [] EVAL (LOW) 69085   [] EVAL (MOD) 20296  [] EVAL (HIGH) 07036   [] RE-EVAL     [x] DH(24444) x   2  [] IONTO  [] NMR (56859) x   [] VASO  [] Manual (20539) x     [] Other:  [] TA x      [] Mech Traction (01181)  [] ES(attended) (42948)      [] ES (un) (26090):     GOALS:  Patient stated goal: walk normally  [] Progressing: [] Met: [] Not Met: [] Adjusted     Therapist goals for Patient:   Short Term Goals: To be achieved in: 2 weeks  1. Independent in HEP and progression per patient tolerance, in order to prevent re-injury. [] Progressing: [] Met: [] Not Met: [] Adjusted  2. Patient will have a decrease in pain to facilitate improvement in movement, function, and ADLs as indicated by Functional Deficits. [] Progressing: [] Met: [] Not Met: [] Adjusted     Long Term Goals: To be achieved in: 6 weeks  1. Disability index score of 35% or more per LEFS to assist with reaching prior level of function. [] Progressing: [] Met: [] Not Met: [] Adjusted  2. Patient will demonstrate increased PROM to 0-130 to allow for proper joint functioning as indicated by patients Functional Deficits. [] Progressing: [] Met: [] Not Met: [] Adjusted  3. Patient will demonstrate an increase in Strength to good proximal hip strength and control, within 5lb HHD in LE to allow for proper functional mobility as indicated by patients Functional Deficits. [] Progressing: [] Met: [] Not Met: [] Adjusted  4. Patient will return to normal ambulation on level surfaces without gait deviation increased symptoms or restriction. [] Progressing: [] Met: [] Not Met: [] Adjusted  5.

## 2023-07-28 ENCOUNTER — HOSPITAL ENCOUNTER (OUTPATIENT)
Dept: PHYSICAL THERAPY | Age: 63
Setting detail: THERAPIES SERIES
Discharge: HOME | End: 2023-07-28
Payer: COMMERCIAL

## 2023-07-28 PROCEDURE — 97110 THERAPEUTIC EXERCISES: CPT | Performed by: PHYSICAL THERAPIST

## 2023-07-28 NOTE — FLOWSHEET NOTE
for activities related to improving balance, coordination, kinesthetic sense, posture, motor skill, proprioception  to assist with LE, proximal hip, and core control in self care, mobility, lifting, ambulation and eccentric single leg control. NMR and Therapeutic Activities:    [] (49107 or 85760) Provided verbal/tactile cueing for activities related to improving balance, coordination, kinesthetic sense, posture, motor skill, proprioception and motor activation to allow for proper function of core, proximal hip and LE with self care and ADLs  [] (91125) Gait Re-education- Provided training and instruction to the patient for proper LE, core and proximal hip recruitment and positioning and eccentric body weight control with ambulation re-education including up and down stairs     Home Exercise Program:    [x] (19549) Reviewed/Progressed HEP activities related to strengthening, flexibility, endurance, ROM of core, proximal hip and LE for functional self-care, mobility, lifting and ambulation/stair navigation   [] (47318)Reviewed/Progressed HEP activities related to improving balance, coordination, kinesthetic sense, posture, motor skill, proprioception of core, proximal hip and LE for self care, mobility, lifting, and ambulation/stair navigation      Manual Treatments:  PROM / STM / Oscillations-Mobs:  G-I, II, III, IV (PA's, Inf., Post.)  [x] (01101) Provided manual therapy to mobilize LE, proximal hip and/or LS spine soft tissue/joints for the purpose of modulating pain, promoting relaxation,  increasing ROM, reducing/eliminating soft tissue swelling/inflammation/restriction, improving soft tissue extensibility and allowing for proper ROM for normal function with self care, mobility, lifting and ambulation. Modalities:     [] GAME READY (VASO)- for significant edema, swelling, pain control. Vasopneumatic compression applied to left knee  for significant edema, swelling, pain control.       Girth Left Right

## 2023-08-02 ENCOUNTER — HOSPITAL ENCOUNTER (OUTPATIENT)
Dept: PHYSICAL THERAPY | Age: 63
Setting detail: THERAPIES SERIES
Discharge: HOME | End: 2023-08-02
Payer: COMMERCIAL

## 2023-08-02 PROCEDURE — 97110 THERAPEUTIC EXERCISES: CPT | Performed by: PHYSICAL THERAPIST

## 2023-08-02 NOTE — FLOWSHEET NOTE
-5     Knee flex 100 after stretches 132               Strength Quad set  fair Fair +               ambulation  2 crutches, brace locked WBAT        RESTRICTIONS/PRECAUTIONS: per MD note: She is to have brace unlocked and to start weaning off crutches (2, to 1 to none) over next couple of weeks, then work out of the brace. Exercises/Interventions:     Therapeutic Ex (74235)/ NMR re-education (40076) Sets/sec Notes/CUES   hep   hep   Knee flexion heelslides Hep (100 after stretches)   SLR with QS 2# 2 x 10     SL ABD 2# 2 x 10    Bridges 15 x 5\"        LAQ 4#  2x10 hep   Weight shift hep   Hip machine TKE  abd 45# 3 sec 2x10  45# 10x each leg    Leg press 70# 2 x 10     Fwd step up   6\" block 10x/ 8 in 10 x     LSU and over 8 in 10 x     HR/ TR 15 x     Mini Squats 15 x     Recumbent bike 5 min     Tandem stance / SLS Airex 2 x 30\" / 5 x 10\"     LBW OVL 2 laps            Manual Intervention (48549)                            Therapeutic Activity (20189)                                     HEP instruction: Access Code: [email or web address]  URL: [email or web address]  Date: 07/12/2023  Prepared by: Larry Harrison     Exercises  - Long Sitting Calf Stretch with Strap  - 2 x daily - 7 x weekly - 5 reps - 30 hold  - Long Sitting Quad Set  - 2 x daily - 7 x weekly - 10 reps - 10 hold  - Sitting Heel Slide with Towel  - 2 x daily - 7 x weekly - 10 reps - 10 hold  - Seated Long Arc Quad  - 2-3 x daily - 7 x weekly - 2 sets - 10 reps  - Standing Weight Shift Side to Side  - 2 x daily - 7 x weekly - 10 reps - 10 hold       Therapeutic Exercise and NMR EXR  [x] (93426) Provided verbal/tactile cueing for activities related to strengthening, flexibility, endurance, ROM for improvements in LE, proximal hip, and core control with self care, mobility, lifting, ambulation.  [] (31034) Provided verbal/tactile cueing for activities related to improving balance, coordination, kinesthetic sense, posture, motor skill, proprioception  to

## 2023-08-04 ENCOUNTER — HOSPITAL ENCOUNTER (OUTPATIENT)
Dept: PHYSICAL THERAPY | Age: 63
Setting detail: THERAPIES SERIES
Discharge: HOME | End: 2023-08-04
Payer: COMMERCIAL

## 2023-08-04 PROCEDURE — 97110 THERAPEUTIC EXERCISES: CPT | Performed by: PHYSICAL THERAPIST

## 2023-08-04 NOTE — FLOWSHEET NOTE
verbal/tactile cueing for activities related to improving balance, coordination, kinesthetic sense, posture, motor skill, proprioception  to assist with LE, proximal hip, and core control in self care, mobility, lifting, ambulation and eccentric single leg control. NMR and Therapeutic Activities:    [] (37378 or 69259) Provided verbal/tactile cueing for activities related to improving balance, coordination, kinesthetic sense, posture, motor skill, proprioception and motor activation to allow for proper function of core, proximal hip and LE with self care and ADLs  [] (78463) Gait Re-education- Provided training and instruction to the patient for proper LE, core and proximal hip recruitment and positioning and eccentric body weight control with ambulation re-education including up and down stairs     Home Exercise Program:    [x] (80299) Reviewed/Progressed HEP activities related to strengthening, flexibility, endurance, ROM of core, proximal hip and LE for functional self-care, mobility, lifting and ambulation/stair navigation   [] (28812)Reviewed/Progressed HEP activities related to improving balance, coordination, kinesthetic sense, posture, motor skill, proprioception of core, proximal hip and LE for self care, mobility, lifting, and ambulation/stair navigation      Manual Treatments:  PROM / STM / Oscillations-Mobs:  G-I, II, III, IV (PA's, Inf., Post.)  [x] (80501) Provided manual therapy to mobilize LE, proximal hip and/or LS spine soft tissue/joints for the purpose of modulating pain, promoting relaxation,  increasing ROM, reducing/eliminating soft tissue swelling/inflammation/restriction, improving soft tissue extensibility and allowing for proper ROM for normal function with self care, mobility, lifting and ambulation. Modalities:     [] GAME READY (VASO)- for significant edema, swelling, pain control. Vasopneumatic compression applied to left knee  for significant edema, swelling, pain control.

## 2023-09-07 ENCOUNTER — OFFICE VISIT (OUTPATIENT)
Dept: ORTHOPEDIC SURGERY | Age: 63
End: 2023-09-07
Payer: COMMERCIAL

## 2023-09-07 DIAGNOSIS — S72.492D: Primary | ICD-10-CM

## 2023-09-07 DIAGNOSIS — M25.562 LEFT KNEE PAIN, UNSPECIFIED CHRONICITY: ICD-10-CM

## 2023-09-07 PROCEDURE — 99213 OFFICE O/P EST LOW 20 MIN: CPT | Performed by: ORTHOPAEDIC SURGERY

## 2023-09-07 NOTE — PROGRESS NOTES
FOLLOW UP ORTHOPAEDIC NOTE    The patient follows up today for reevaluation of left knee. The patient states 2/10 pain at its most.  She states that she is gone to 10 physical therapy visits and is pleased with the overall results thus far and denies knee instability. She states the medial sided knee pain as effectively resolved. Dull throbbing aching pain occasional secondary to known knee osteoarthritis. PE:  AAOx3  RR  Unlabored breathing  Skin warm and moist  Focused physical examination of the left knee  0/124/0 active range of motion. Trace to 1+ opening at 30 degrees valgus without pain with firm endpoint and stable at 0 degrees valgus. Stable to varus at 0 and 30 degrees. Nontender to palpation along the medial epicondyle  No gross change in neurovascular examination    Pertinent radiographs/imaging:  AP left knee 9/7/2023: Incorporated and healed far medial epicondyle avulsion fracture     Diagnosis Orders   1. Avulsion fracture of femoral condyle, left, closed, with routine healing, subsequent encounter        2. Left knee pain, unspecified chronicity  XR KNEE LEFT (1-2 VIEWS)        Assessment and plan: 58 female with continued subjective symptoms of left knee pain with known, correlating diagnosis of clinically and radiographically healed left knee medial epicondyle femoral avulsion fracture. -Time of 12 minutes was spent coordinating and discussing the clinical findings and diagnostic imaging results as they pertain to the patient's presenting subjective symptoms.  -I had a pleasant discussion with the patient today.   Her exam is stable without gross worsening and she is subjectively without significant symptoms.  -The occasional dull throbbing aching pain that she has in association with knee osteoarthritis consideration for viscosupplementation injection therapy in the future  -Ultimately transition to a home exercise program  -OTC Tylenol Aleve per bottle as needed discomfort  -All

## 2024-11-13 NOTE — ED NOTES
Dr. Mathieu Dove at bedside for NIHSS at 2151. Code Stroke called.       Cristela Smith RN  03/09/23 0796 Patient is calling and would like to speak to Sully in regards to Eliquis cost and a possible alternative.    Please call and advise